# Patient Record
Sex: MALE | Race: WHITE | Employment: FULL TIME | URBAN - NONMETROPOLITAN AREA
[De-identification: names, ages, dates, MRNs, and addresses within clinical notes are randomized per-mention and may not be internally consistent; named-entity substitution may affect disease eponyms.]

---

## 2021-04-30 ENCOUNTER — HOSPITAL ENCOUNTER (EMERGENCY)
Age: 31
Discharge: HOME | End: 2021-04-30
Attending: EMERGENCY MEDICINE
Payer: COMMERCIAL

## 2021-04-30 VITALS
OXYGEN SATURATION: 100 % | SYSTOLIC BLOOD PRESSURE: 157 MMHG | RESPIRATION RATE: 18 BRPM | TEMPERATURE: 99 F | HEART RATE: 77 BPM | DIASTOLIC BLOOD PRESSURE: 90 MMHG

## 2021-04-30 DIAGNOSIS — K46.9 NON-RECURRENT ABDOMINAL HERNIA WITHOUT OBSTRUCTION OR GANGRENE, UNSPECIFIED HERNIA TYPE: Primary | ICD-10-CM

## 2021-04-30 PROCEDURE — 99283 EMERGENCY DEPT VISIT LOW MDM: CPT

## 2021-04-30 ASSESSMENT — PAIN DESCRIPTION - PAIN TYPE: TYPE: ACUTE PAIN

## 2021-04-30 ASSESSMENT — ENCOUNTER SYMPTOMS
NAUSEA: 0
CHEST TIGHTNESS: 0
ABDOMINAL PAIN: 1
RHINORRHEA: 0
COLOR CHANGE: 0
SHORTNESS OF BREATH: 0
DIARRHEA: 0
VOMITING: 0

## 2021-04-30 ASSESSMENT — PAIN DESCRIPTION - LOCATION: LOCATION: ABDOMEN

## 2021-04-30 ASSESSMENT — PAIN DESCRIPTION - FREQUENCY: FREQUENCY: CONTINUOUS

## 2021-04-30 ASSESSMENT — PAIN DESCRIPTION - ORIENTATION: ORIENTATION: RIGHT;LOWER

## 2021-05-01 NOTE — ED PROVIDER NOTES
[facility] ED  Emergency Department Encounter  EmergencyMedicine Attending     Pt Laurence Flores  MRN: [id number]  [place] 1990  Date of evaluation: 4/30/21  PCP:  SYDNEE Pimentel CNP    CHIEF COMPLAINT       Chief Complaint   Patient presents with    Abdominal Pain     bulging presenting with shooting pains       HISTORY OF PRESENT ILLNESS  (Location/Symptom, Timing/Onset, Context/Setting, Quality, Duration, Modifying Factors, Severity.)      Yandy Jaime is a 32 y.o. male who presents with concern for a abdominal hernia. Patient says that earlier he felt a bulge in his right lower quadrant, and he had pain over that bulge area. His significant other is in the room who also agrees that there was a bulge present in the right lower quadrant of the abdomen. This has been ongoing for the last 2 days, intermittently, passing gas and having bowel movements. However on arrival the bulging is completely resolved. The patient no longer has any abdominal pain. He is back to being completely asymptomatic, there is no abdominal pain, no nausea vomiting, completely normal appetite, no testicle pain or swelling, no dysuria frequency urgency, patient is completely asymptomatic and has no symptoms anymore. PAST MEDICAL / SURGICAL / SOCIAL / FAMILY HISTORY      has a past medical history of GERD (gastroesophageal reflux disease) and Other irritable bowel syndrome. has a past surgical history that includes Upper gastrointestinal endoscopy (08/08/2016) and Colonoscopy (09/26/2016).     Social History     Socioeconomic History    Marital status: Single     Spouse name: Not on file    Number of children: Not on file    Years of education: Not on file    Highest education level: Not on file   Occupational History    Not on file   Social Needs    Financial resource strain: Not on file    Food insecurity     Worry: Not on file     Inability: Not on file   IceBreaker needs Medical: Not on file     Non-medical: Not on file   Tobacco Use    Smoking status: Current Every Day Smoker     Packs/day: 1.00     Types: Cigarettes     Start date: 1/1/2004    Smokeless tobacco: Never Used    Tobacco comment: pt quit in 2016 and has since started back up   Substance and Sexual Activity    Alcohol use: No    Drug use: Never    Sexual activity: Not on file   Lifestyle    Physical activity     Days per week: Not on file     Minutes per session: Not on file    Stress: Not on file   Relationships    Social connections     Talks on phone: Not on file     Gets together: Not on file     Attends Hoahaoism service: Not on file     Active member of club or organization: Not on file     Attends meetings of clubs or organizations: Not on file     Relationship status: Not on file    Intimate partner violence     Fear of current or ex partner: Not on file     Emotionally abused: Not on file     Physically abused: Not on file     Forced sexual activity: Not on file   Other Topics Concern    Not on file   Social History Narrative    Not on file       History reviewed. No pertinent family history. Allergies:  Patient has no known allergies. Home Medications:  Prior to Admission medications    Medication Sig Start Date End Date Taking? Authorizing Provider   Eluxadoline (VIBERZI) 100 MG TABS Take 100 mg by mouth 2 times daily for 60 days. 3/31/21 5/30/21 Yes Katelyn Daly MD   Omeprazole 20 MG TBDD Take 1 tablet by mouth daily    Yes Historical Provider, MD   Probiotic Product (PROBIOTIC PO) Take by mouth    Historical Provider, MD       REVIEW OF SYSTEMS    (2-9 systems for level 4, 10 or more for level 5)      Review of Systems   Constitutional: Negative for chills and fever. HENT: Negative for congestion and rhinorrhea. Respiratory: Negative for chest tightness and shortness of breath. Cardiovascular: Negative for chest pain and leg swelling.    Gastrointestinal: Positive for abdominal pain. Negative for diarrhea, nausea and vomiting. Abdominal pain that is now resolved   Genitourinary: Negative for dysuria, scrotal swelling and testicular pain. Skin: Negative for color change. Neurological: Negative for weakness and numbness. Psychiatric/Behavioral: Negative for confusion. PHYSICAL EXAM   (up to 7 for level 4, 8 or more for level 5)      INITIAL VITALS:   BP (!) 157/90   Pulse 77   Temp 99 °F (37.2 °C) (Tympanic)   Resp 18   SpO2 100%     Physical Exam  Constitutional:       General: He is not in acute distress. Appearance: He is well-developed. HENT:      Head: Normocephalic and atraumatic. Eyes:      General:         Right eye: No discharge. Left eye: No discharge. Conjunctiva/sclera: Conjunctivae normal.   Cardiovascular:      Rate and Rhythm: Normal rate and regular rhythm. Heart sounds: Normal heart sounds. No murmur. No friction rub. No gallop. Pulmonary:      Effort: Pulmonary effort is normal. No respiratory distress. Breath sounds: Normal breath sounds. No wheezing or rales. Abdominal:      General: There is no distension. Palpations: Abdomen is soft. Tenderness: There is no abdominal tenderness. There is no guarding or rebound. Comments: Absolutely no abdominal pain or tenderness on examination, no tenderness over the right lower quadrant at the McBurney point. No abdominal mass or hernias present on my exam.  Patient is completely back to his baseline, abdomen is soft, nontender, nondistended   Musculoskeletal:         General: No tenderness. Skin:     General: Skin is warm and dry. Findings: No erythema. DIFFERENTIAL  DIAGNOSIS     PLAN (LABS / IMAGING / EKG):  No orders of the defined types were placed in this encounter. MEDICATIONS ORDERED:  No orders of the defined types were placed in this encounter.       DDX: Hernia versus torsion versus appendicitis    DIAGNOSTIC RESULTS /

## 2021-11-11 ENCOUNTER — HOSPITAL ENCOUNTER (OUTPATIENT)
Age: 31
Discharge: HOME | End: 2021-11-11
Payer: COMMERCIAL

## 2021-11-11 DIAGNOSIS — K58.0 IRRITABLE BOWEL SYNDROME WITH DIARRHEA: ICD-10-CM

## 2021-11-11 LAB
AMPHETAMINE SCREEN URINE: NEGATIVE
BARBITURATE SCREEN URINE: NEGATIVE
BENZODIAZEPINE SCREEN, URINE: NEGATIVE
BUPRENORPHINE URINE: NEGATIVE
CANNABINOID SCREEN URINE: NEGATIVE
COCAINE METABOLITE, URINE: NEGATIVE
MDMA URINE: NORMAL
METHADONE SCREEN, URINE: NEGATIVE
METHAMPHETAMINE, URINE: NEGATIVE
OPIATES, URINE: NEGATIVE
OXYCODONE SCREEN URINE: NEGATIVE
PHENCYCLIDINE, URINE: NEGATIVE
PROPOXYPHENE, URINE: NEGATIVE
TEST INFORMATION: NORMAL
TRICYCLIC ANTIDEPRESSANTS, UR: NEGATIVE

## 2021-11-11 PROCEDURE — 80306 DRUG TEST PRSMV INSTRMNT: CPT

## 2023-02-03 ENCOUNTER — HOSPITAL ENCOUNTER (OUTPATIENT)
Age: 33
Discharge: HOME | End: 2023-02-03
Payer: COMMERCIAL

## 2023-02-03 DIAGNOSIS — K58.0 IRRITABLE BOWEL SYNDROME WITH DIARRHEA: ICD-10-CM

## 2023-02-03 LAB
AMPHETAMINE SCREEN URINE: NEGATIVE
BARBITURATE SCREEN URINE: NEGATIVE
BENZODIAZEPINE SCREEN, URINE: NEGATIVE
BUPRENORPHINE URINE: NEGATIVE
CANNABINOID SCREEN URINE: NEGATIVE
COCAINE METABOLITE, URINE: NEGATIVE
FENTANYL URINE: NEGATIVE
METHADONE SCREEN, URINE: NEGATIVE
OPIATES, URINE: POSITIVE
OXYCODONE SCREEN URINE: NEGATIVE
PHENCYCLIDINE, URINE: NEGATIVE

## 2023-02-03 PROCEDURE — 80307 DRUG TEST PRSMV CHEM ANLYZR: CPT

## 2023-05-17 ENCOUNTER — APPOINTMENT (OUTPATIENT)
Dept: GENERAL RADIOLOGY | Age: 33
End: 2023-05-17
Payer: COMMERCIAL

## 2023-05-17 ENCOUNTER — HOSPITAL ENCOUNTER (EMERGENCY)
Age: 33
Discharge: HOME | End: 2023-05-17
Attending: EMERGENCY MEDICINE
Payer: COMMERCIAL

## 2023-05-17 VITALS
OXYGEN SATURATION: 98 % | HEART RATE: 68 BPM | TEMPERATURE: 97.8 F | RESPIRATION RATE: 16 BRPM | DIASTOLIC BLOOD PRESSURE: 74 MMHG | SYSTOLIC BLOOD PRESSURE: 139 MMHG

## 2023-05-17 DIAGNOSIS — S61.209A AVULSION OF FINGER TIP, INITIAL ENCOUNTER: Primary | ICD-10-CM

## 2023-05-17 PROCEDURE — 6370000000 HC RX 637 (ALT 250 FOR IP): Performed by: EMERGENCY MEDICINE

## 2023-05-17 PROCEDURE — 99284 EMERGENCY DEPT VISIT MOD MDM: CPT

## 2023-05-17 PROCEDURE — 73140 X-RAY EXAM OF FINGER(S): CPT

## 2023-05-17 PROCEDURE — 82075 ASSAY OF BREATH ETHANOL: CPT

## 2023-05-17 RX ORDER — CEPHALEXIN 500 MG/1
500 CAPSULE ORAL 2 TIMES DAILY
Qty: 10 CAPSULE | Refills: 0 | Status: SHIPPED | OUTPATIENT
Start: 2023-05-17 | End: 2023-05-22

## 2023-05-17 RX ORDER — BACITRACIN ZINC 500 [USP'U]/G
OINTMENT TOPICAL 2 TIMES DAILY
Status: DISCONTINUED | OUTPATIENT
Start: 2023-05-17 | End: 2023-05-17 | Stop reason: HOSPADM

## 2023-05-17 RX ADMIN — BACITRACIN ZINC: 500 OINTMENT TOPICAL at 10:32

## 2023-05-17 ASSESSMENT — PAIN - FUNCTIONAL ASSESSMENT: PAIN_FUNCTIONAL_ASSESSMENT: 0-10

## 2023-05-17 ASSESSMENT — LIFESTYLE VARIABLES: HOW OFTEN DO YOU HAVE A DRINK CONTAINING ALCOHOL: NEVER

## 2023-05-17 ASSESSMENT — PAIN DESCRIPTION - DESCRIPTORS: DESCRIPTORS: DISCOMFORT

## 2023-05-17 ASSESSMENT — PAIN SCALES - GENERAL: PAINLEVEL_OUTOF10: 2

## 2023-05-17 ASSESSMENT — PAIN DESCRIPTION - ORIENTATION: ORIENTATION: LEFT

## 2023-05-17 ASSESSMENT — PAIN DESCRIPTION - LOCATION: LOCATION: FINGER (COMMENT WHICH ONE)

## 2023-05-17 NOTE — DISCHARGE INSTRUCTIONS
Keep the wound clean and dry. Change the bandage twice daily or anytime it becomes wet or soiled. Apply bacitracin ointment at each bandage change. Return to the ED for increasing pain, swelling of the finger, development of fever or any other concerns. Avoid exposure to any potential contaminating substances, such as dirty water.

## 2023-05-17 NOTE — ED NOTES
Wound care performed and dressing put on patients left finger per Dr. Shiela Issa.      Leif Easton RN  05/17/23 [id number]

## 2023-05-19 NOTE — ED PROVIDER NOTES
[address]      Pt Name: Bhavya Farrell  MRN: [id number]  Birthdate 1990  Date of evaluation: 5/17/2023  Provider: Monica Pulido MD    CHIEF COMPLAINT       Chief Complaint   Patient presents with    Hand Injury     Crushing injury to left ring finger         HISTORY OF PRESENT ILLNESS      Bhavya Farrell is a right-hand-dominant 35 y.o. male who presents to the emergency department for evaluation of a crushing injury to the left ring finger that occurred 2 days prior. 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 Initially did not think anything of the injury, but noted a skin avulsion with some bleeding associated with this when he removed his glove. Has kept the wound clean and bandaged since, but when he removed the bandage today he noticed that his skin was bunched up under the nail. He does have some mild discomfort. No swelling. No fever. No change in strength or sensation. No other complaints at this time. PAST MEDICAL HISTORY     Past Medical History:   Diagnosis Date    GERD (gastroesophageal reflux disease)     Other irritable bowel syndrome     ibs d         SURGICAL HISTORY       Past Surgical History:   Procedure Laterality Date    COLONOSCOPY  09/26/2016    -bx    UPPER GASTROINTESTINAL ENDOSCOPY  08/08/2016    Dr. Isamar Rushing- Biopsy = Negative for H Pylori. CURRENT MEDICATIONS       Discharge Medication List as of 5/17/2023 10:05 AM        CONTINUE these medications which have NOT CHANGED    Details   Eluxadoline (VIBERZI) 100 MG TABS Take 1 tablet by mouth in the morning and at bedtime for 90 days. Max Daily Amount: 2 tablets, Disp-60 tablet, R-2Normal      Omeprazole 20 MG TBDD Take 1 tablet by mouth daily Historical Med             ALLERGIES       Patient has no known allergies.     FAMILY HISTORY       Family History   Problem Relation Age of Onset    No Known Problems Mother     No

## 2023-08-16 ENCOUNTER — HOSPITAL ENCOUNTER (EMERGENCY)
Age: 33
Discharge: HOME | End: 2023-08-16
Attending: EMERGENCY MEDICINE
Payer: COMMERCIAL

## 2023-08-16 ENCOUNTER — APPOINTMENT (OUTPATIENT)
Dept: GENERAL RADIOLOGY | Age: 33
End: 2023-08-16
Payer: COMMERCIAL

## 2023-08-16 VITALS
RESPIRATION RATE: 16 BRPM | HEART RATE: 80 BPM | SYSTOLIC BLOOD PRESSURE: 122 MMHG | OXYGEN SATURATION: 99 % | TEMPERATURE: 98.1 F | DIASTOLIC BLOOD PRESSURE: 80 MMHG

## 2023-08-16 DIAGNOSIS — S63.125A CLOSED DISLOCATION OF INTERPHALANGEAL JOINT OF LEFT THUMB, INITIAL ENCOUNTER: Primary | ICD-10-CM

## 2023-08-16 PROCEDURE — 99283 EMERGENCY DEPT VISIT LOW MDM: CPT

## 2023-08-16 PROCEDURE — 73140 X-RAY EXAM OF FINGER(S): CPT

## 2023-08-16 PROCEDURE — 2500000003 HC RX 250 WO HCPCS: Performed by: EMERGENCY MEDICINE

## 2023-08-16 PROCEDURE — 26770 TREAT FINGER DISLOCATION: CPT

## 2023-08-16 PROCEDURE — 82075 ASSAY OF BREATH ETHANOL: CPT

## 2023-08-16 RX ORDER — LIDOCAINE HYDROCHLORIDE 10 MG/ML
2 INJECTION, SOLUTION EPIDURAL; INFILTRATION; INTRACAUDAL; PERINEURAL ONCE
Status: COMPLETED | OUTPATIENT
Start: 2023-08-16 | End: 2023-08-16

## 2023-08-16 RX ADMIN — LIDOCAINE HYDROCHLORIDE 2 ML: 10 INJECTION, SOLUTION EPIDURAL; INFILTRATION; INTRACAUDAL; PERINEURAL at 14:54

## 2023-08-16 ASSESSMENT — ENCOUNTER SYMPTOMS
ABDOMINAL DISTENTION: 0
SORE THROAT: 0
SHORTNESS OF BREATH: 0
BACK PAIN: 0

## 2023-08-16 ASSESSMENT — PAIN - FUNCTIONAL ASSESSMENT
PAIN_FUNCTIONAL_ASSESSMENT: NONE - DENIES PAIN
PAIN_FUNCTIONAL_ASSESSMENT: 0-10

## 2023-08-16 ASSESSMENT — PAIN DESCRIPTION - ORIENTATION: ORIENTATION: LEFT

## 2023-08-16 ASSESSMENT — PAIN SCALES - GENERAL: PAINLEVEL_OUTOF10: 4

## 2023-08-16 ASSESSMENT — PAIN DESCRIPTION - LOCATION: LOCATION: HAND

## 2023-08-16 NOTE — DISCHARGE INSTRUCTIONS
Keep the splint on at all times. Follow-up with occupational health and or orthopedics in the next 2 to 3 days. Tylenol and ibuprofen for pain as needed. No use of the left hand until follow-up with occupational health.

## 2023-08-16 NOTE — ED PROVIDER NOTES
[address] [facility] ED  EMERGENCY DEPARTMENT ENCOUNTER      Pt Name: Ganga Costello  MRN: [id number]  [address] [facility] 1990  Date of evaluation: 8/16/2023  Provider: Dustin Rivera       Chief Complaint   Patient presents with    Hand Injury     Patient got chain caught around left hand/ thumb and injury occurred. HISTORY OF PRESENT ILLNESS   (Location/Symptom, Timing/Onset, Context/Setting, Quality, Duration, Modifying Factors, Severity)  Note limiting factors. Ganga Costello is a 35 y.o. male who presents to the emergency department with left thumb injury. Patient was at work when his left thumb got caught between a chain and a pole. Currently does appears deformed with possible proximal phalanx dislocation. He denies any pain until somebody moves his thumb. There is no open wounds. Patient denies any other injuries. REVIEW OF SYSTEMS    (2-9 systems for level 4, 10 or more for level 5)     Review of Systems   Constitutional:  Negative for fatigue and fever. HENT:  Negative for congestion and sore throat. Eyes:  Negative for visual disturbance. Respiratory:  Negative for shortness of breath. Cardiovascular:  Negative for chest pain and palpitations. Gastrointestinal:  Negative for abdominal distention. Genitourinary:  Negative for dysuria. Musculoskeletal:  Negative for back pain. Left thumb deformity   Skin:  Negative for rash. Psychiatric/Behavioral:  Negative for suicidal ideas. Except as noted above the remainder of the review of systems was reviewed and negative. PAST MEDICAL HISTORY     Past Medical History:   Diagnosis Date    GERD (gastroesophageal reflux disease)     Other irritable bowel syndrome     ibs d         SURGICAL HISTORY       Past Surgical History:   Procedure Laterality Date    COLONOSCOPY  09/26/2016    -bx    UPPER GASTROINTESTINAL ENDOSCOPY  08/08/2016    Dr. Erich Hogan- Biopsy = Negative for H Pylori.

## 2023-08-23 ENCOUNTER — HOSPITAL ENCOUNTER (OUTPATIENT)
Dept: MRI IMAGING | Age: 33
Discharge: HOME | End: 2023-08-25
Payer: COMMERCIAL

## 2023-08-23 DIAGNOSIS — T14.8XXA SPRAIN: ICD-10-CM

## 2023-08-23 PROCEDURE — 73218 MRI UPPER EXTREMITY W/O DYE: CPT

## 2023-08-25 ENCOUNTER — HOSPITAL ENCOUNTER
Age: 33
Discharge: HOME | End: 2023-08-25
Payer: COMMERCIAL

## 2023-08-25 VITALS
OXYGEN SATURATION: 95 % | RESPIRATION RATE: 16 BRPM | SYSTOLIC BLOOD PRESSURE: 123 MMHG | HEART RATE: 67 BPM | TEMPERATURE: 98 F | DIASTOLIC BLOOD PRESSURE: 77 MMHG

## 2023-08-25 DIAGNOSIS — Z01.810: Primary | ICD-10-CM

## 2023-08-25 DIAGNOSIS — Z01.812: ICD-10-CM

## 2023-08-25 DIAGNOSIS — S63.682A: ICD-10-CM

## 2023-08-25 DIAGNOSIS — F17.290: ICD-10-CM

## 2023-08-25 LAB
ADD MANUAL DIFF: NO
ANION GAP: 13.6
BLOOD UREA NITROGEN: 14 MG/DL (ref 7–18)
CALCIUM: 9.3 MG/DL (ref 8.5–10.1)
CARBON DIOXIDE: 28.9 MMOL/L (ref 21–32)
CHLORIDE: 104 MMOL/L (ref 98–107)
CO2 BLD-SCNC: 28.9 MMOL/L (ref 21–32)
ESTIMATED GFR (AFRICAN AMERICA: >60 (ref 60–?)
ESTIMATED GFR (NON-AFRICAN AME: >60 (ref 60–?)
GLUCOSE BLD-MCNC: 91 MG/DL (ref 74–106)
HCT VFR BLD CALC: 43.7 % (ref 42–54)
HEMATOCRIT: 43.7 % (ref 42–54)
HEMOGLOBIN: 14.9 G/DL (ref 14–18)
IMMATURE GRANULOCYTES ABS AUTO: 0.02 10^3/UL (ref 0–0.03)
IMMATURE GRANULOCYTES PCT AUTO: 0.3 % (ref 0–0.5)
LYMPHOCYTES  ABSOLUTE AUTO: 1.8 10^3/UL (ref 1.2–3.8)
MCV RBC: 89.2 FL (ref 80–94)
MEAN CORPUSCULAR HEMOGLOBIN: 30.4 PG (ref 25.9–34)
MEAN CORPUSCULAR HGB CONC: 34.1 G/DL (ref 29.9–35.2)
MEAN CORPUSCULAR VOLUME: 89.2 FL (ref 80–94)
PLATELET # BLD: 153 10^3/UL (ref 150–450)
PLATELET COUNT: 153 10^3/UL (ref 150–450)
POTASSIUM SERPLBLD-SCNC: 3.5 MMOL/L (ref 3.5–5.1)
POTASSIUM: 3.5 MMOL/L (ref 3.5–5.1)
RED BLOOD COUNT: 4.9 10^6/UL (ref 4.7–6.1)
SODIUM BLD-SCNC: 143 MMOL/L (ref 136–145)
SODIUM: 143 MMOL/L (ref 136–145)
WBC # BLD: 6 10^3/UL (ref 4–11)
WHITE BLOOD COUNT: 6 10^3/UL (ref 4–11)

## 2023-08-25 PROCEDURE — 71046 X-RAY EXAM CHEST 2 VIEWS: CPT

## 2023-08-25 PROCEDURE — G0463 HOSPITAL OUTPT CLINIC VISIT: HCPCS

## 2023-08-25 PROCEDURE — 80048 BASIC METABOLIC PNL TOTAL CA: CPT

## 2023-08-25 PROCEDURE — 85025 COMPLETE CBC W/AUTO DIFF WBC: CPT

## 2023-08-25 PROCEDURE — 36415 COLL VENOUS BLD VENIPUNCTURE: CPT

## 2023-08-25 NOTE — XR_ITS
The [address] [address] 
     [phone] 
  
  
Patient Name: 
MAUREEN AVILES 
  
MRN: [id number]    YOB: 1990    Sex: M 
Assigned Patient Location: [facility] 
[facility] 
Accession/Order Number: [id number] 
Exam Date: 8/25/2023  13:00    Report Date: 8/25/2023  13:20 
  
At the request of: 
ROSIO LIZARRAGA   
  
Procedure:  XR chest 2V 
  
EXAM: XR chest 2V  
  
HISTORY: PRE OP EXAM  
  
COMPARISON: None.  
  
TECHNIQUE: PA and lateral views of the chest. 
  
FINDINGS:  
The cardiomediastinal silhouette is normal. 
  
No focal consolidation is identified. 
There is no pneumothorax. 
No pleural effusion is noted. 
  
The osseous structures are intact. 
  
ORDER #: [id number] XR/XR chest 2V  
IMPRESSION:   
No acute cardiopulmonary process.  
   
   
Electronically authenticated by: UMM HARRIS   Date: 8/25/2023  13:20

## 2023-08-25 NOTE — P.GSHP_ITS
History of Present Illness    
History of Present Illness    
Chief complaint: left thumb ulnar collateral ligament tear    
Narrative:     
Patient presents for preadmission testing. Please see HPI from Dr. Hernandez   
dated 08/25/2023.    
    
Review of Systems    
    
    
ROS      
    
 Narrative     
REVIEW OF SYSTEMS:  Negative except as stated in HPI, ten or more systems   
reviewed.    
              
   Constitutional:  No fever , chills, weakness    
         
     ENT:  No sore throat or epistaxis    
    
     Cardiovascular: No edema, chest pain, palpitations, or activity intolerance    
    
     Respiratory:  No shortness of breath, cough, or wheezing    
    
     Gastrointestinal:  No abdominal pain, constipation, diarrhea, or vomiting    
        
     Genitourinary:  No dysuria or hematuria    
          
     Neurological:  No numbness, tingling, weakness, or headache    
     
     Psychiatric:  No mood changes    
       
    
    
PFSH    
PFS    
Medical History (Updated 08/25/23 @ 12:50 by Fabiola Youngblood NP)    
    
    
    
Surgical History (Updated 08/25/23 @ 12:50 by Fabiola Youngblood NP)    
    
    
    
Social History (Updated 08/25/23 @ 12:48 by Fabiola Youngblood NP)    
Within the past year, how often did you have a drink containing alcohol:  never     
Score interpretation:  A score less than 4 is consistent with normal alcohol   
consumption.     
Do you use any of these nicotine containing products:  vaping products     
Non-prescribed substance use:  denies use     
Previous occupational history:       
Highest level of school completed/degree received:  high school graduate     
    
    
    
Meds    
Home Medications and Allergies    
                                Home Medications    
    
    
    
 Medication  Instructions  Recorded  Confirmed  Type    
     
eluxadoline 100 mg tablet (Viberzi) 100 mg PO BID 08/25/23 08/25/23 History    
     
hydrocodone 5 mg-acetaminophen 325 1 tab PO Q6H PRN pain 08/25/23 08/25/23   
History    
    
mg tablet        
     
omeprazole 40 mg capsule,delayed 40 mg PO DAILY 08/25/23 08/25/23 History    
    
release        
    
    
    
                                    Allergies    
    
    
    
Allergy/AdvReac Type Severity Reaction Status Date / Time    
     
No Known Drug Allergies Allergy   Verified 08/25/23 12:46    
    
    
    
    
Exam    
Narrative    
Exam Narrative:     
Constitutional: Awake, alert, comfortable, well-appearing, nontoxic,   
interactive, vital signs as charted    
Head: Normocephalic, atraumatic    
Neck: Supple, normal appearance, normal range of motion, no meningeal signs, no   
lymphadenopathy    
Respiratory: No respiratory distress, breath sounds clear    
Cardiovascular: Regular rate and rhythm, strong and regular heart tones    
Psychiatric: Oriented ?3, normal affect    
    
    
Assessment and Plan    
Assessment and Plan    
(1) Ligament tear:     
    
Plan    
Left thumb ulnar collateral ligament repair versus reconstruction with palmaris   
longus, possible pinning scheduled with Dr. Hernandez 08/28/2023.

## 2023-08-28 ENCOUNTER — HOSPITAL ENCOUNTER (OUTPATIENT)
Age: 33
Discharge: HOME | End: 2023-08-28
Payer: COMMERCIAL

## 2023-08-28 VITALS
DIASTOLIC BLOOD PRESSURE: 88 MMHG | HEART RATE: 68 BPM | OXYGEN SATURATION: 97 % | SYSTOLIC BLOOD PRESSURE: 163 MMHG | RESPIRATION RATE: 17 BRPM

## 2023-08-28 VITALS
SYSTOLIC BLOOD PRESSURE: 169 MMHG | OXYGEN SATURATION: 100 % | RESPIRATION RATE: 19 BRPM | HEART RATE: 77 BPM | DIASTOLIC BLOOD PRESSURE: 98 MMHG

## 2023-08-28 VITALS
SYSTOLIC BLOOD PRESSURE: 165 MMHG | OXYGEN SATURATION: 98 % | DIASTOLIC BLOOD PRESSURE: 93 MMHG | HEART RATE: 64 BPM | RESPIRATION RATE: 10 BRPM

## 2023-08-28 VITALS
RESPIRATION RATE: 18 BRPM | OXYGEN SATURATION: 99 % | SYSTOLIC BLOOD PRESSURE: 123 MMHG | DIASTOLIC BLOOD PRESSURE: 78 MMHG | HEART RATE: 69 BPM

## 2023-08-28 VITALS
OXYGEN SATURATION: 98 % | SYSTOLIC BLOOD PRESSURE: 132 MMHG | DIASTOLIC BLOOD PRESSURE: 76 MMHG | HEART RATE: 88 BPM | TEMPERATURE: 97.2 F | RESPIRATION RATE: 16 BRPM

## 2023-08-28 VITALS
HEART RATE: 73 BPM | SYSTOLIC BLOOD PRESSURE: 160 MMHG | RESPIRATION RATE: 18 BRPM | OXYGEN SATURATION: 98 % | DIASTOLIC BLOOD PRESSURE: 92 MMHG

## 2023-08-28 VITALS
OXYGEN SATURATION: 100 % | RESPIRATION RATE: 26 BRPM | SYSTOLIC BLOOD PRESSURE: 158 MMHG | HEART RATE: 81 BPM | DIASTOLIC BLOOD PRESSURE: 106 MMHG

## 2023-08-28 VITALS
OXYGEN SATURATION: 100 % | RESPIRATION RATE: 17 BRPM | SYSTOLIC BLOOD PRESSURE: 158 MMHG | DIASTOLIC BLOOD PRESSURE: 106 MMHG | HEART RATE: 78 BPM | TEMPERATURE: 98.42 F

## 2023-08-28 VITALS
HEART RATE: 66 BPM | SYSTOLIC BLOOD PRESSURE: 162 MMHG | RESPIRATION RATE: 14 BRPM | DIASTOLIC BLOOD PRESSURE: 81 MMHG | OXYGEN SATURATION: 99 %

## 2023-08-28 VITALS — BODY MASS INDEX: 23.8 KG/M2

## 2023-08-28 DIAGNOSIS — F17.290: ICD-10-CM

## 2023-08-28 DIAGNOSIS — S63.641A: Primary | ICD-10-CM

## 2023-08-28 PROCEDURE — 36415 COLL VENOUS BLD VENIPUNCTURE: CPT

## 2023-08-28 PROCEDURE — C1713 ANCHOR/SCREW BN/BN,TIS/BN: HCPCS

## 2023-08-28 PROCEDURE — 76000 FLUOROSCOPY <1 HR PHYS/QHP: CPT

## 2023-08-28 PROCEDURE — 26540 REPAIR HAND JOINT: CPT

## 2023-08-28 PROCEDURE — 73140 X-RAY EXAM OF FINGER(S): CPT

## 2023-08-28 NOTE — PM.ORPRC
Procedure Note
Date of procedure: 08/28/23
Pre-op diagnosis: Left thumb ulnar collateral ligament tear at metacarpal phalangeal joint
Post-op diagnosis: same as pre-op
Procedure: 
Procedure:
Left thumb ulnar collateral ligament repair

Operative procedure:
After informed consent was obtained the patient brought to the operating room where a general anesthetic was administered.  Median nerve block and ring block for the superficial branch of radial nerve was performed with an equal mixture of 5 mL 1% 
lidocaine with epinephrine and 5 mL 0.5% Marcaine plain.  A well-padded proximal arm tourniquet was placed.  The left arm was prepped and draped in the usual sterile fashion.
A 3-1/2 cm incision was made in the mid axial line centered over the ulnar collateral ligament at the metacarpophalangeal joint.  Blunt dissection was carried down through soft tissue in order to preserve digital neurovascular structures.  The 
abductor aponeurosis had been torn.  The joint was subluxed.  The ulnar collateral ligament was identified.  This was in good condition and was easily mobilized to its insertion onto the proximal phalanx.  Metacarpal head and articular cartilage at 
the base of the proximal phalanx were inspected and no articular cartilage damage was evident.  The joint was irrigated.  There was performed using the Arthrex internal brace system.
A 2 guidepins were first placed 1 at the origin of the ulnar collateral ligament at the metacarpal and 1 at the ulnar collateral ligament insertion on the proximal phalanx.  X-rays confirmed appropriate placement.  These were then overdrilled with 
using the stop at 1 cm.  Next a 2-0 FiberWire suture was placed in a running locking fashion within the ulnar collateral ligament.  This was then attached to DX swivel lock SL fork tip anchor along with the preloaded labral tape.  The ligament was 
repaired to the proximal phalanx resulting in a nice reduction of the joint and stable repair.  The 2 labral tape ends were then crossed over the top of the repair and inserted to the previously drilled hole in the metacarpal secured with a second 
DX swivel lock SL fork tip anchor.  Solid repair was achieved.  With the repair there was complete stability to the ulnar collateral ligament with reduced joint.  X-rays revealed a reduced joint in multiple planes.  The abductor aponeurosis was 
repaired with 4-0 Vicryl suture in an interrupted figure-of-eight fashion.  Tourniquet was deflated wound was irrigated and closed in standard fashion with absorbable suture.  Steri-Strips and sterile dressing were placed.  A plaster thumb spica 
splint was placed.  Patient was awakened and brought to the recovery room in stable condition.  There were no intraoperative or immediate postoperative complications.
Anesthesia: General-LMA
Estimated blood loss (mL): 10
Pathology: none sent
Condition: stable
Disposition: same day

## 2023-08-28 NOTE — XR_ITS
The [address] [address] 
     [phone] 
  
  
Patient Name: 
MAUREEN AVILES 
  
MRN: [id number]    YOB: 1990    Sex: M 
Assigned Patient Location: SURGOUT 
Current Patient Location:   
Accession/Order Number: [id number] 
Exam Date: 8/28/2023  13:30    Report Date: 8/28/2023  15:49 
  
At the request of: 
ROSIO LIZARRAGA   
  
Procedure:  XR finger LT min 2V 
  
EXAM: XR finger LT min 2V  
  
HISTORY: LT THUMB ULNAR COLLATERAL LIGAMENT REPAIR 
  
COMPARISON: None.  
  
ORDER #: [id number] XR/XR finger LT min 2V  
Impression:  
1. Operative fluoroscopy was provided and 3 spot images of the left first   
phalanx were obtained.  
   
   
Electronically authenticated by: EMANUEL MCFADDEN   Date: 8/28/2023  15:49

## 2023-08-28 NOTE — P.ORPRC_ITS
Procedure Note    
Date of procedure: 08/28/23    
Pre-op diagnosis: Left thumb ulnar collateral ligament tear at metacarpal   
phalangeal joint    
Post-op diagnosis: same as pre-op    
Procedure:     
Procedure:    
Left thumb ulnar collateral ligament repair    
    
Operative procedure:    
After informed consent was obtained the patient brought to the operating room   
where a general anesthetic was administered.  Median nerve block and ring block   
for the superficial branch of radial nerve was performed with an equal mixture   
of 5 mL 1% lidocaine with epinephrine and 5 mL 0.5% Marcaine plain.  A well-  
padded proximal arm tourniquet was placed.  The left arm was prepped and draped   
in the usual sterile fashion.    
A 3-1/2 cm incision was made in the mid axial line centered over the ulnar   
collateral ligament at the metacarpophalangeal joint.  Blunt dissection was   
carried down through soft tissue in order to preserve digital neurovascular   
structures.  The abductor aponeurosis had been torn.  The joint was subluxed.    
The ulnar collateral ligament was identified.  This was in good condition and   
was easily mobilized to its insertion onto the proximal phalanx.  Metacarpal   
head and articular cartilage at the base of the proximal phalanx were inspected   
and no articular cartilage damage was evident.  The joint was irrigated.  There   
was performed using the Arthrex internal brace system.    
A 2 guidepins were first placed 1 at the origin of the ulnar collateral ligament  
at the metacarpal and 1 at the ulnar collateral ligament insertion on the   
proximal phalanx.  X-rays confirmed appropriate placement.  These were then   
overdrilled with using the stop at 1 cm.  Next a 2-0 FiberWire suture was placed  
in a running locking fashion within the ulnar collateral ligament.  This was   
then attached to DX swivel lock SL fork tip anchor along with the preloaded   
labral tape.  The ligament was repaired to the proximal phalanx resulting in a   
nice reduction of the joint and stable repair.  The 2 labral tape ends were then  
crossed over the top of the repair and inserted to the previously drilled hole   
in the metacarpal secured with a second DX swivel lock SL fork tip anchor.    
Solid repair was achieved.  With the repair there was complete stability to the   
ulnar collateral ligament with reduced joint.  X-rays revealed a reduced joint   
in multiple planes.  The abductor aponeurosis was repaired with 4-0 Vicryl   
suture in an interrupted figure-of-eight fashion.  Tourniquet was deflated wound  
was irrigated and closed in standard fashion with absorbable suture.  Steri-  
Strips and sterile dressing were placed.  A plaster thumb spica splint was   
placed.  Patient was awakened and brought to the recovery room in stable   
condition.  There were no intraoperative or immediate postoperative   
complications.    
Anesthesia: General-LMA    
Estimated blood loss (mL): 10    
Pathology: none sent    
Condition: stable    
Disposition: same day

## 2023-08-30 ENCOUNTER — HOSPITAL ENCOUNTER (EMERGENCY)
Age: 33
Discharge: HOME | End: 2023-08-30
Attending: EMERGENCY MEDICINE
Payer: COMMERCIAL

## 2023-08-30 VITALS
HEART RATE: 96 BPM | OXYGEN SATURATION: 100 % | RESPIRATION RATE: 18 BRPM | DIASTOLIC BLOOD PRESSURE: 98 MMHG | TEMPERATURE: 97.8 F | SYSTOLIC BLOOD PRESSURE: 167 MMHG

## 2023-08-30 DIAGNOSIS — R00.2 PALPITATIONS: Primary | ICD-10-CM

## 2023-08-30 PROCEDURE — 93005 ELECTROCARDIOGRAM TRACING: CPT | Performed by: EMERGENCY MEDICINE

## 2023-08-30 PROCEDURE — 99283 EMERGENCY DEPT VISIT LOW MDM: CPT

## 2023-08-30 RX ORDER — OXYCODONE HYDROCHLORIDE AND ACETAMINOPHEN 325; 5 MG/5ML; MG/5ML
5 SOLUTION ORAL EVERY 6 HOURS
COMMUNITY
End: 2023-09-01

## 2023-08-30 ASSESSMENT — PAIN - FUNCTIONAL ASSESSMENT: PAIN_FUNCTIONAL_ASSESSMENT: NONE - DENIES PAIN

## 2023-08-30 ASSESSMENT — ENCOUNTER SYMPTOMS
SHORTNESS OF BREATH: 0
TROUBLE SWALLOWING: 1
SORE THROAT: 0
BACK PAIN: 0
ABDOMINAL DISTENTION: 0

## 2023-08-30 NOTE — ED PROVIDER NOTES
Mouth/Throat:      Mouth: Mucous membranes are moist.      Pharynx: Uvula midline. Comments: The uvula is slightly enlarged and bruised likely secondary to recent intubation 2 days ago. Otherwise oropharynx is clear. Eyes:      Extraocular Movements: Extraocular movements intact. Pupils: Pupils are equal, round, and reactive to light. Cardiovascular:      Rate and Rhythm: Normal rate and regular rhythm. Pulses: Normal pulses. Heart sounds: Normal heart sounds. Pulmonary:      Effort: Pulmonary effort is normal.      Breath sounds: Normal breath sounds. Abdominal:      General: Abdomen is flat. Bowel sounds are normal.      Palpations: Abdomen is soft. Musculoskeletal:         General: Normal range of motion. Skin:     General: Skin is warm and dry. Capillary Refill: Capillary refill takes less than 2 seconds. Neurological:      General: No focal deficit present. Mental Status: He is alert and oriented to person, place, and time. Psychiatric:         Mood and Affect: Mood normal.       DIAGNOSTIC RESULTS     EKG: All EKG's are interpreted by the Emergency Department Physician who either signs or Co-signs this chart in the absence of a cardiologist.    Normal sinus rhythm with a heart rate of 86. No acute ST-T wave changes. RADIOLOGY:   Non-plain film images such as CT, Ultrasound and MRI are read by the radiologist. Plain radiographic images are visualized and preliminarily interpreted by the emergency physician with the below findings:        Interpretation per the Radiologist below, if available at the time of this note:    No orders to display         ED BEDSIDE ULTRASOUND:   Performed by ED Physician - none    LABS:  Labs Reviewed - No data to display    All other labs were within normal range or not returned as of this dictation.     EMERGENCY DEPARTMENT COURSE and DIFFERENTIAL DIAGNOSIS/MDM:   Vitals:    Vitals:    08/30/23 1943   BP: (!) 167/98   Pulse: 96

## 2023-08-31 LAB
EKG ATRIAL RATE: 86 BPM
EKG P AXIS: 81 DEGREES
EKG P-R INTERVAL: 140 MS
EKG Q-T INTERVAL: 370 MS
EKG QRS DURATION: 94 MS
EKG QTC CALCULATION (BAZETT): 442 MS
EKG R AXIS: 46 DEGREES
EKG T AXIS: -10 DEGREES
EKG VENTRICULAR RATE: 86 BPM

## 2023-08-31 PROCEDURE — 93010 ELECTROCARDIOGRAM REPORT: CPT | Performed by: FAMILY MEDICINE

## 2023-08-31 NOTE — DISCHARGE INSTRUCTIONS
Avoid using the oxycodone as much as possible. If you can just take Tylenol or ibuprofen. For breakthrough pains you can go back to using Percocets or Vicodin. Recommend cold liquids and ice chips to help heal the uvula as it is still bruised and slightly swollen. Avoid hot liquids. Return if your symptoms get worse.

## 2023-09-07 ENCOUNTER — HOSPITAL ENCOUNTER (OUTPATIENT)
Dept: GENERAL RADIOLOGY | Age: 33
Discharge: HOME | End: 2023-09-09
Payer: COMMERCIAL

## 2023-09-07 ENCOUNTER — HOSPITAL ENCOUNTER (OUTPATIENT)
Age: 33
Discharge: HOME | End: 2023-09-09

## 2023-09-07 ENCOUNTER — HOSPITAL ENCOUNTER (OUTPATIENT)
Age: 33
Discharge: HOME | End: 2023-09-07
Payer: COMMERCIAL

## 2023-09-07 DIAGNOSIS — R06.02 SHORTNESS OF BREATH: ICD-10-CM

## 2023-09-07 LAB — D DIMER PPP FEU-MCNC: <0.27 UG/ML FEU (ref 0–0.59)

## 2023-09-07 PROCEDURE — 36415 COLL VENOUS BLD VENIPUNCTURE: CPT

## 2023-09-07 PROCEDURE — 85379 FIBRIN DEGRADATION QUANT: CPT

## 2023-09-07 PROCEDURE — 71046 X-RAY EXAM CHEST 2 VIEWS: CPT

## 2023-09-12 ENCOUNTER — APPOINTMENT (OUTPATIENT)
Dept: GENERAL RADIOLOGY | Age: 33
End: 2023-09-12
Payer: COMMERCIAL

## 2023-09-12 ENCOUNTER — HOSPITAL ENCOUNTER (EMERGENCY)
Age: 33
Discharge: HOME | End: 2023-09-12
Attending: STUDENT IN AN ORGANIZED HEALTH CARE EDUCATION/TRAINING PROGRAM
Payer: COMMERCIAL

## 2023-09-12 VITALS
HEART RATE: 62 BPM | TEMPERATURE: 98.2 F | BODY MASS INDEX: 21.9 KG/M2 | DIASTOLIC BLOOD PRESSURE: 77 MMHG | SYSTOLIC BLOOD PRESSURE: 137 MMHG | WEIGHT: 157 LBS | OXYGEN SATURATION: 99 % | RESPIRATION RATE: 14 BRPM

## 2023-09-12 DIAGNOSIS — R07.89 OTHER CHEST PAIN: Primary | ICD-10-CM

## 2023-09-12 DIAGNOSIS — R06.02 SHORTNESS OF BREATH: ICD-10-CM

## 2023-09-12 LAB
ANION GAP SERPL CALCULATED.3IONS-SCNC: 14 MMOL/L (ref 9–17)
BASOPHILS # BLD: <0.03 K/UL (ref 0–0.2)
BASOPHILS NFR BLD: 0 % (ref 0–2)
BUN SERPL-MCNC: 7 MG/DL (ref 6–20)
BUN/CREAT SERPL: 8 (ref 9–20)
CALCIUM SERPL-MCNC: 9.8 MG/DL (ref 8.6–10.4)
CHLORIDE SERPL-SCNC: 101 MMOL/L (ref 98–107)
CO2 SERPL-SCNC: 24 MMOL/L (ref 20–31)
CREAT SERPL-MCNC: 0.9 MG/DL (ref 0.7–1.2)
EKG ATRIAL RATE: 87 BPM
EKG P AXIS: 70 DEGREES
EKG P-R INTERVAL: 132 MS
EKG Q-T INTERVAL: 380 MS
EKG QRS DURATION: 92 MS
EKG QTC CALCULATION (BAZETT): 457 MS
EKG R AXIS: 43 DEGREES
EKG T AXIS: 5 DEGREES
EKG VENTRICULAR RATE: 87 BPM
EOSINOPHIL # BLD: <0.03 K/UL (ref 0–0.44)
EOSINOPHILS RELATIVE PERCENT: 0 % (ref 1–4)
ERYTHROCYTE [DISTWIDTH] IN BLOOD BY AUTOMATED COUNT: 11.7 % (ref 11.8–14.4)
GFR SERPL CREATININE-BSD FRML MDRD: >60 ML/MIN/1.73M2
GLUCOSE SERPL-MCNC: 123 MG/DL (ref 70–99)
HCT VFR BLD AUTO: 41.8 % (ref 40.7–50.3)
HGB BLD-MCNC: 14.6 G/DL (ref 13–17)
IMM GRANULOCYTES # BLD AUTO: <0.03 K/UL (ref 0–0.3)
IMM GRANULOCYTES NFR BLD: 0 %
LYMPHOCYTES NFR BLD: 2.03 K/UL (ref 1.1–3.7)
LYMPHOCYTES RELATIVE PERCENT: 32 % (ref 24–43)
MCH RBC QN AUTO: 30.4 PG (ref 25.2–33.5)
MCHC RBC AUTO-ENTMCNC: 34.9 G/DL (ref 28.4–34.8)
MCV RBC AUTO: 87.1 FL (ref 82.6–102.9)
MONOCYTES NFR BLD: 0.45 K/UL (ref 0.1–1.2)
MONOCYTES NFR BLD: 7 % (ref 3–12)
NEUTROPHILS NFR BLD: 61 % (ref 36–65)
NEUTS SEG NFR BLD: 3.77 K/UL (ref 1.5–8.1)
NRBC BLD-RTO: 0 PER 100 WBC
PLATELET # BLD AUTO: 183 K/UL (ref 138–453)
PMV BLD AUTO: 11.9 FL (ref 8.1–13.5)
POTASSIUM SERPL-SCNC: 3.4 MMOL/L (ref 3.7–5.3)
RBC # BLD AUTO: 4.8 M/UL (ref 4.21–5.77)
SODIUM SERPL-SCNC: 139 MMOL/L (ref 135–144)
TROPONIN I SERPL HS-MCNC: <6 NG/L (ref 0–22)
WBC OTHER # BLD: 6.3 K/UL (ref 3.5–11.3)

## 2023-09-12 PROCEDURE — 80048 BASIC METABOLIC PNL TOTAL CA: CPT

## 2023-09-12 PROCEDURE — 71045 X-RAY EXAM CHEST 1 VIEW: CPT

## 2023-09-12 PROCEDURE — 93005 ELECTROCARDIOGRAM TRACING: CPT | Performed by: STUDENT IN AN ORGANIZED HEALTH CARE EDUCATION/TRAINING PROGRAM

## 2023-09-12 PROCEDURE — 84484 ASSAY OF TROPONIN QUANT: CPT

## 2023-09-12 PROCEDURE — 99285 EMERGENCY DEPT VISIT HI MDM: CPT

## 2023-09-12 PROCEDURE — 93010 ELECTROCARDIOGRAM REPORT: CPT | Performed by: INTERNAL MEDICINE

## 2023-09-12 PROCEDURE — 85025 COMPLETE CBC W/AUTO DIFF WBC: CPT

## 2023-09-12 PROCEDURE — 36415 COLL VENOUS BLD VENIPUNCTURE: CPT

## 2023-09-12 RX ORDER — FAMOTIDINE 20 MG/1
20 TABLET, FILM COATED ORAL 2 TIMES DAILY
Qty: 60 TABLET | Refills: 0 | Status: SHIPPED | OUTPATIENT
Start: 2023-09-12 | End: 2023-10-12

## 2023-09-12 ASSESSMENT — ENCOUNTER SYMPTOMS
SHORTNESS OF BREATH: 1
DIARRHEA: 0
VOMITING: 0
COUGH: 1
NAUSEA: 0
ABDOMINAL DISTENTION: 0
EYES NEGATIVE: 1

## 2023-09-12 ASSESSMENT — LIFESTYLE VARIABLES
HOW MANY STANDARD DRINKS CONTAINING ALCOHOL DO YOU HAVE ON A TYPICAL DAY: PATIENT DOES NOT DRINK
HOW OFTEN DO YOU HAVE A DRINK CONTAINING ALCOHOL: NEVER

## 2023-09-13 NOTE — ED PROVIDER NOTES
[facility] ED  Emergency Department Encounter  Emergency Medicine Attending     Pt Junito Benjamin  MRN: [id number]  [address] [place] 1990  Date of evaluation: 9/12/23  PCP:  SYDNEE Alvarado CNP  Note Started: 8:28 PM EDT      CHIEF COMPLAINT       Chief Complaint   Patient presents with    Chest Pain     Pt c/o CP and SOB ongoing. Pt states he was here recently for same thing and followed up with PCP. Pt states it happened after eating a hamburger. HISTORY OF PRESENT ILLNESS  (Location/Symptom, Timing/Onset, Context/Setting, Quality, Duration, Modifying Factors, Severity.)      Maday Pardo is a 35 y.o. male who presents with chest pain shortness of breath that has been happening intermittently especially after eating for several weeks. Patient has been on IBS medication for years and had recent surgery and was taken off and there has been some difficulty with his GI ever since. Patient states he has a burning-like sensation that stopped in the middle of his chest and feel like there is a rock stuck there afterwards and nothing can be swallowed to go down. Patient states that he does not know why this continues to happen but was hoping to have some relief and some explanation which is what brought him into the ER today. PAST MEDICAL / SURGICAL / SOCIAL / FAMILY HISTORY      has a past medical history of GERD (gastroesophageal reflux disease) and Other irritable bowel syndrome. has a past surgical history that includes Upper gastrointestinal endoscopy (08/08/2016) and Colonoscopy (09/26/2016).       Social History     Socioeconomic History    Marital status: Single     Spouse name: Not on file    Number of children: Not on file    Years of education: Not on file    Highest education level: Not on file   Occupational History    Not on file   Tobacco Use    Smoking status: Former     Packs/day: 1     Types: Cigarettes     Start date: 1/1/2004     Quit date: 10/2021

## 2023-09-14 ENCOUNTER — HOSPITAL ENCOUNTER (OUTPATIENT)
Dept: CT IMAGING | Age: 33
Discharge: HOME | End: 2023-09-16
Attending: ORTHOPAEDIC SURGERY
Payer: COMMERCIAL

## 2023-09-14 DIAGNOSIS — R06.02 SHORTNESS OF BREATH: ICD-10-CM

## 2023-09-14 PROCEDURE — 71250 CT THORAX DX C-: CPT

## 2023-09-18 ENCOUNTER — HOSPITAL ENCOUNTER (OUTPATIENT)
Age: 33
Discharge: HOME | End: 2023-09-18
Payer: COMMERCIAL

## 2023-09-18 DIAGNOSIS — K21.9 GASTROESOPHAGEAL REFLUX DISEASE, UNSPECIFIED WHETHER ESOPHAGITIS PRESENT: ICD-10-CM

## 2023-09-18 DIAGNOSIS — K29.00 ACUTE GASTRITIS, PRESENCE OF BLEEDING UNSPECIFIED, UNSPECIFIED GASTRITIS TYPE: ICD-10-CM

## 2023-09-18 PROCEDURE — 87338 HPYLORI STOOL AG IA: CPT

## 2023-09-19 LAB
MICROORGANISM/AGENT SPEC: NEGATIVE
SPECIMEN DESCRIPTION: NORMAL

## 2023-09-19 NOTE — RESULT ENCOUNTER NOTE
Please call pt and inform them their h pylori results are normal.  Continue current meds,  Please see if GI at [facility] has called him yet.

## 2023-10-16 ENCOUNTER — HOSPITAL ENCOUNTER (EMERGENCY)
Age: 33
Discharge: HOME | End: 2023-10-16

## 2023-10-16 ENCOUNTER — HOSPITAL ENCOUNTER (EMERGENCY)
Age: 33
Discharge: HOME | End: 2023-10-16
Payer: COMMERCIAL

## 2023-10-16 VITALS — HEART RATE: 67 BPM | RESPIRATION RATE: 17 BRPM

## 2023-10-16 VITALS
OXYGEN SATURATION: 99 % | DIASTOLIC BLOOD PRESSURE: 89 MMHG | RESPIRATION RATE: 20 BRPM | SYSTOLIC BLOOD PRESSURE: 151 MMHG | HEART RATE: 86 BPM | TEMPERATURE: 98.42 F

## 2023-10-16 VITALS
OXYGEN SATURATION: 100 % | DIASTOLIC BLOOD PRESSURE: 81 MMHG | SYSTOLIC BLOOD PRESSURE: 124 MMHG | RESPIRATION RATE: 18 BRPM | HEART RATE: 69 BPM

## 2023-10-16 VITALS
RESPIRATION RATE: 12 BRPM | SYSTOLIC BLOOD PRESSURE: 124 MMHG | HEART RATE: 72 BPM | OXYGEN SATURATION: 98 % | DIASTOLIC BLOOD PRESSURE: 81 MMHG

## 2023-10-16 VITALS — HEART RATE: 68 BPM | RESPIRATION RATE: 24 BRPM

## 2023-10-16 VITALS — HEART RATE: 66 BPM | RESPIRATION RATE: 24 BRPM

## 2023-10-16 VITALS — HEART RATE: 74 BPM | OXYGEN SATURATION: 100 % | RESPIRATION RATE: 18 BRPM

## 2023-10-16 VITALS — HEART RATE: 83 BPM | RESPIRATION RATE: 22 BRPM

## 2023-10-16 VITALS — HEART RATE: 61 BPM | RESPIRATION RATE: 22 BRPM

## 2023-10-16 VITALS — SYSTOLIC BLOOD PRESSURE: 151 MMHG | DIASTOLIC BLOOD PRESSURE: 89 MMHG | OXYGEN SATURATION: 99 %

## 2023-10-16 VITALS
OXYGEN SATURATION: 100 % | TEMPERATURE: 98 F | RESPIRATION RATE: 14 BRPM | SYSTOLIC BLOOD PRESSURE: 127 MMHG | DIASTOLIC BLOOD PRESSURE: 86 MMHG | HEART RATE: 75 BPM

## 2023-10-16 VITALS — HEART RATE: 67 BPM | RESPIRATION RATE: 20 BRPM

## 2023-10-16 VITALS — HEART RATE: 66 BPM | RESPIRATION RATE: 18 BRPM

## 2023-10-16 VITALS — HEART RATE: 61 BPM | OXYGEN SATURATION: 100 %

## 2023-10-16 VITALS — HEART RATE: 66 BPM | RESPIRATION RATE: 13 BRPM

## 2023-10-16 VITALS — OXYGEN SATURATION: 98 %

## 2023-10-16 VITALS — HEART RATE: 69 BPM | RESPIRATION RATE: 16 BRPM

## 2023-10-16 VITALS — BODY MASS INDEX: 21.6 KG/M2

## 2023-10-16 DIAGNOSIS — K58.1: ICD-10-CM

## 2023-10-16 DIAGNOSIS — Z79.899: ICD-10-CM

## 2023-10-16 DIAGNOSIS — E87.6: Primary | ICD-10-CM

## 2023-10-16 DIAGNOSIS — I45.10: ICD-10-CM

## 2023-10-16 LAB
ADD MANUAL DIFF: NO
ALANINE AMINOTRANSFERASE: 16 U/L (ref 16–63)
ALBUMIN GLOBULIN RATIO: 1.5
ALBUMIN LEVEL: 4.6 G/DL (ref 3.4–5)
ALKALINE PHOSPHATASE: 48 U/L (ref 46–116)
ANION GAP: 15.3
ASPARTATE AMINO TRANSFERASE: 11 U/L (ref 15–37)
BLOOD UREA NITROGEN: 10 MG/DL (ref 7–18)
CALCIUM: 9.6 MG/DL (ref 8.5–10.1)
CARBON DIOXIDE: 23.9 MMOL/L (ref 21–32)
CHLORIDE: 105 MMOL/L (ref 98–107)
CO2 BLD-SCNC: 23.9 MMOL/L (ref 21–32)
ESTIMATED GFR (AFRICAN AMERICA: >60 (ref 60–?)
ESTIMATED GFR (NON-AFRICAN AME: >60 (ref 60–?)
GLOBULIN: 3.1 G/DL
GLUCOSE BLD-MCNC: 100 MG/DL (ref 74–106)
HCT VFR BLD CALC: 42.9 % (ref 42–54)
HEMATOCRIT: 42.9 % (ref 42–54)
HEMOGLOBIN: 14.6 G/DL (ref 14–18)
IMMATURE GRANULOCYTES ABS AUTO: 0.02 10^3/UL (ref 0–0.03)
IMMATURE GRANULOCYTES PCT AUTO: 0.3 % (ref 0–0.5)
LYMPHOCYTES  ABSOLUTE AUTO: 1.6 10^3/UL (ref 1.2–3.8)
MCV RBC: 89.6 FL (ref 80–94)
MEAN CORPUSCULAR HEMOGLOBIN: 30.5 PG (ref 25.9–34)
MEAN CORPUSCULAR HGB CONC: 34 G/DL (ref 29.9–35.2)
MEAN CORPUSCULAR VOLUME: 89.6 FL (ref 80–94)
NT PRO B TYPE NATRIURETIC PEPT: 46 PG/ML (ref ?–450)
PLATELET # BLD: 170 10^3/UL (ref 150–450)
PLATELET COUNT: 170 10^3/UL (ref 150–450)
POTASSIUM SERPLBLD-SCNC: 3.2 MMOL/L (ref 3.5–5.1)
POTASSIUM: 3.2 MMOL/L (ref 3.5–5.1)
RED BLOOD COUNT: 4.79 10^6/UL (ref 4.7–6.1)
SODIUM BLD-SCNC: 141 MMOL/L (ref 136–145)
SODIUM: 141 MMOL/L (ref 136–145)
TOTAL PROTEIN: 7.7 G/DL (ref 6.4–8.2)
WBC # BLD: 6.5 10^3/UL (ref 4–11)
WHITE BLOOD COUNT: 6.5 10^3/UL (ref 4–11)

## 2023-10-16 PROCEDURE — 83880 ASSAY OF NATRIURETIC PEPTIDE: CPT

## 2023-10-16 PROCEDURE — 93005 ELECTROCARDIOGRAM TRACING: CPT

## 2023-10-16 PROCEDURE — 85025 COMPLETE CBC W/AUTO DIFF WBC: CPT

## 2023-10-16 PROCEDURE — 80053 COMPREHEN METABOLIC PANEL: CPT

## 2023-10-16 PROCEDURE — 86140 C-REACTIVE PROTEIN: CPT

## 2023-10-16 PROCEDURE — 84484 ASSAY OF TROPONIN QUANT: CPT

## 2023-10-16 PROCEDURE — 99285 EMERGENCY DEPT VISIT HI MDM: CPT

## 2023-10-16 PROCEDURE — 36415 COLL VENOUS BLD VENIPUNCTURE: CPT

## 2023-10-16 PROCEDURE — 71045 X-RAY EXAM CHEST 1 VIEW: CPT

## 2023-10-16 NOTE — ED.GENADUL1
HPI - General Adult
General
Chief complaint: Chest Pain
Stated complaint: BACK PAIN RADIATING TO CP
Time Seen by Provider: 10/16/23 16:12
Source: patient
Mode of arrival: walk-in
Limitations: no limitations
History of Present Illness
HPI narrative: 
patient here with a multitude of symptoms. He had  repair of a thumb ligamentous injury and had general anesthesia for that he says he just hasn't felt very good since that time. Recently he's been having symptoms of dizziness. He states he feels 
like he might pass out , he's weak and fatigued and tired. He is not been running a fever. He has irritable bowel syndrome with constipation. He was and does admit to using pain medication postoperatively. He's had several Emergency Room visits and 
multiple visits to his primary care doctor. He said to today and yesterday he had some discomfort in the middle of his back that radiates to the front of his chest and that seems to be the chief concern today. He does not have hypertension diabetes 
or elevation of his cholesterol. He is not a previous coronary artery disease. He's not had heart catheterizations or stress test of them aware of. His vitals here shows slight elevation of his blood pressure otherwise normal. 12-lead EKG was done 
on his arrival showed an incomplete right bundle-branch block but no STEMI.
Related Data
Home Medications

 Medication  Instructions  Recorded  Confirmed
eluxadoline 100 mg tablet (Viberzi) 100 mg PO BID 08/25/23 10/16/23
pantoprazole 40 mg tablet,delayed 40 mg PO BID 10/16/23 10/16/23
release   


Allergies

Allergy/AdvReac Type Severity Reaction Status Date / Time
No Known Drug Allergies Allergy   Verified 08/25/23 12:46



[facility]
Medical History (Updated 10/16/23 @ 17:49 by Gerard Raymond MD)

COVID-19
 ?U07.1 - COVID-19 (ICD-10)
GERD (gastroesophageal reflux disease)
 ?K21.9 - Gastro-esophageal reflux disease without esophagitis (ICD-10)
Ligament tear
 ?T14.8XXA - Other injury of unspecified body region, initial encounter (ICD-10)


Surgical History (Updated 08/25/23 @ 12:50 by Fabiola Youngblood NP)

History of colonoscopy
 ?Z98.890 - Other specified postprocedural states (ICD-10)


Social History (Updated 08/25/23 @ 12:48 by Fabiola Youngblood NP)
Within the past year, how often did you have a drink containing alcohol:  never 
Score interpretation:  A score less than 4 is consistent with normal alcohol consumption. 
Smoking status:  Light tobacco smoker 
Do you use any of these nicotine containing products:  vaping products 
Non-prescribed substance use:  denies use 
Previous occupational history:   
Highest level of school completed/degree received:  high school graduate 



Exam
Narrative
Exam Narrative: 
awake alert pleasant maintains good eye contact. Described all his surgeries and medication usage accurately.

Constitutional he does not appear ill or toxic moves about comfortably. Speech cognition appear to be normal his color is good his skin is warm and dry is not pale, has no diaphoresis no scleral icterus. Examination chest shows heart sounds be 
normal with no S3-S4 or murmur.

Restoril his lungs are clear with no wheezes rales or rhonchi he is not coughing and has no wheezing.

Extremities have no evidence of phlebitis or edema. Examination abdomen discloses no guarding rebound rigidity or peritoneal findings.
Constitutional
Vital Signs, click to edit/add: 

Last Vital Signs

Temp  98.5 F   10/16/23 16:12
Pulse  86   10/16/23 16:12
Resp  20   10/16/23 16:12
BP  151/89 H  10/16/23 16:12
Pulse Ox  99   10/16/23 16:12
O2 Del Method  Room Air  10/16/23 16:12




Course
Vital Signs
Vital signs: 

Vital Signs

Temperature  98.5 F   10/16/23 16:12
Pulse Rate  86   10/16/23 16:12
Respiratory Rate  20   10/16/23 16:12
Blood Pressure  151/89 H  10/16/23 16:12
Pulse Oximetry  99   10/16/23 16:12
Oxygen Delivery Method  Room Air  10/16/23 16:12



Temperature  98.5 F   10/16/23 16:12
Pulse Rate  86   10/16/23 16:12
Respiratory Rate  20   10/16/23 16:12
Blood Pressure  151/89 H  10/16/23 16:12
Pulse Oximetry  99   10/16/23 16:12
Oxygen Delivery Method  Room Air  10/16/23 16:12




Medical Decision Making
MDM Narrative
Medical decision making narrative: 
patient presents with a number different complaints. Clinical evaluation here did not disclose any abnormalities on the exam. His chest x-ray does not show any pulmonary abnormalities his EKG was sinus rhythm rate seventy-one there is no ST segment 
elevation arrhythmia or other abnormality. Troponin is normal. His blood workup to disclose hypokalemia which can cause a number symptoms and we will address that with a prescription.  He is to follow-up with his primary care doctor.
Lab Data
Labs: 

Lab Results

  10/16/23 Range/Units
  16:15 
WBC  6.5  (4.0-11.0)  10^3/uL
RBC  4.79  (4.70-6.10)  10^6/uL
Hgb  14.6  (14.0-18.0)  g/dL
Hct  42.9  (42.0-54.0)  %
MCV  89.6  (80.0-94.0)  fL
MCH  30.5  (25.9-34.0)  pg
MCHC  34.0  (29.9-35.2)  g/dL
RDW  11.9  (11.0-15.0)  %
Plt Count  170  (150-450)  10^3/uL
MPV  11.5  (9.5-13.5)  fL
Neut % (Auto)  66.0  (43.0-75.0)  %
Lymph % (Auto)  24.9  (20.5-60.0)  %
Mono % (Auto)  8.0  (1.7-12.0)  %
Eos % (Auto)  0.3 L  (0.9-7.0)  %
Baso % (Auto)  0.5  (0.2-2.0)  %
Neut # (Auto)  4.3  (1.4-6.5)  10^3/uL
Lymph # (Auto)  1.6  (1.2-3.8)  10^3/uL
Mono # (Auto)  0.5  (0.3-0.8)  10^3/uL
Eos # (Auto)  0.0  (0.0-0.7)  10^3/uL
Baso # (Auto)  0.0  (0.0-0.1)  10^3/uL
Abs Immat Gran (auto)  0.02  (0.00-0.03)  10^3/uL
Imm/Tot Granulo (auto)  0.3  (0.0-0.5)  %
Sodium  141  (136-145)  mmol/L
Potassium  3.2 L  (3.5-5.1)  mmol/L
Chloride  105  ()  mmol/L
Carbon Dioxide  23.9  (21.0-32.0)  mmol/L
Anion Gap  15.3  
BUN  10.0  (7.0-18.0)  mg/dL
Creatinine  0.91  (0.70-1.30)  mg/dL
Est GFR ( Amer)  >60  (>=60)  
Est GFR (Non-Af Amer)  >60  (>=60)  
BUN/Creatinine Ratio  11.0  
Glucose  100  ()  mg/dL
Calcium  9.6  (8.5-10.1)  mg/dL
Total Bilirubin  0.6  (0.2-1.0)  mg/dL
AST  11 L  (15-37)  U/L
ALT  16  (16-63)  U/L
Alkaline Phosphatase  48  ()  U/L
Troponin I High Sens  7.2  (4.0-76.1)  pg/mL
NT-Pro-B Natriuret Pep  46.0  (<=450.0)  pg/mL
Total Protein  7.7  (6.4-8.2)  g/dL
Albumin  4.6  (3.4-5.0)  g/dL
Globulin  3.1  g/dL
Albumin/Globulin Ratio  1.5  




Discharge Plan
Discharge
Chief Complaint: Chest Pain

Clinical Impression:
 Acute hypokalemia


Patient Disposition: Home, Self-Care

Time of Disposition Decision: 17:49

Prescriptions / Home Meds:
No Action
  Viberzi 100 mg tablet 
   100 mg PO BID 
  pantoprazole 40 mg tablet,delayed release (DR/EC) 
   40 mg PO BID 

Additional Instructions:
take potassium tablets for three days. Follow-up with primary care doctor

Stand Alone Forms:  Portal Instructions

Referrals:
Physician,Non-Staff, MD [Primary Care Provider] - 1 week

## 2023-10-16 NOTE — XR_ITS
The [address] [address] 
     [phone] 
  
  
Patient Name: 
MAUREEN AVILES 
  
MRN: [id number]    YOB: 1990    Sex: M 
Assigned Patient Location: ED.MAIN 
Current Patient Location: ER 
Accession/Order Number: [id number] 
Exam Date: 10/16/2023  16:48    Report Date: 10/16/2023  16:58 
  
At the request of: 
SUNNY FAGAN   
  
Procedure:  XR chest 1V 
  
EXAM: XR chest 1V  
  
HISTORY: pain 
  
COMPARISON: 8/25/2023  
  
TECHNIQUE: Single AP view of the chest. 
  
FINDINGS: 
  
The lungs are clear. No pleural effusion or pneumothorax. 
  
The cardiomediastinal silhouette is unremarkable. 
  
No acute osseous or soft tissue abnormality. 
  
ORDER #: [id number] XR/XR chest 1V  
IMPRESSION:  
   
1. No acute cardiopulmonary process.   
   
   
Electronically authenticated by: ISABEL REYES   Date: 10/16/2023  16:58

## 2023-10-16 NOTE — ED_ITS
HPI - General Adult    
General    
Chief complaint: Chest Pain    
Stated complaint: BACK PAIN RADIATING TO CP    
Time Seen by Provider: 10/16/23 16:12    
Source: patient    
Mode of arrival: walk-in    
Limitations: no limitations    
History of Present Illness    
HPI narrative:     
patient here with a multitude of symptoms. He had  repair of a thumb ligamentous  
injury and had general anesthesia for that he says he just hasn't felt very good  
since that time. Recently he's been having symptoms of dizziness. He states he   
feels like he might pass out , he's weak and fatigued and tired. He is not been   
running a fever. He has irritable bowel syndrome with constipation. He was and   
does admit to using pain medication postoperatively. He's had several Emergency   
Room visits and multiple visits to his primary care doctor. He said to today and  
yesterday he had some discomfort in the middle of his back that radiates to the   
front of his chest and that seems to be the chief concern today. He does not   
have hypertension diabetes or elevation of his cholesterol. He is not a previous  
coronary artery disease. He's not had heart catheterizations or stress test of   
them aware of. His vitals here shows slight elevation of his blood pressure   
otherwise normal. 12-lead EKG was done on his arrival showed an incomplete right  
bundle-branch block but no STEMI.    
Related Data    
                                Home Medications    
    
    
    
 Medication  Instructions  Recorded  Confirmed    
     
eluxadoline 100 mg tablet (Viberzi) 100 mg PO BID 08/25/23 10/16/23    
     
pantoprazole 40 mg tablet,delayed 40 mg PO BID 10/16/23 10/16/23    
    
release       
    
    
    
                                    Allergies    
    
    
    
Allergy/AdvReac Type Severity Reaction Status Date / Time    
     
No Known Drug Allergies Allergy   Verified 08/25/23 12:46    
    
    
    
    
[facility]    
Medical History (Updated 10/16/23 @ 17:49 by Gerard Raymond MD)    
    
COVID-19    
   ?U07.1 - COVID-19 (ICD-10)    
GERD (gastroesophageal reflux disease)    
   ?K21.9 - Gastro-esophageal reflux disease without esophagitis (ICD-10)    
Ligament tear    
   ?T14.8XXA - Other injury of unspecified body region, initial encounter (ICD-  
   10)    
    
    
Surgical History (Updated 08/25/23 @ 12:50 by Fabiola Youngblood NP)    
    
History of colonoscopy    
   ?Z98.890 - Other specified postprocedural states (ICD-10)    
    
    
Social History (Updated 08/25/23 @ 12:48 by Fabiola Youngblood NP)    
Within the past year, how often did you have a drink containing alcohol:  never     
Score interpretation:  A score less than 4 is consistent with normal alcohol   
consumption.     
Smoking status:  Light tobacco smoker     
Do you use any of these nicotine containing products:  vaping products     
Non-prescribed substance use:  denies use     
Previous occupational history:       
Highest level of school completed/degree received:  high school graduate     
    
    
    
Exam    
Narrative    
Exam Narrative:     
awake alert pleasant maintains good eye contact. Described all his surgeries and  
medication usage accurately.    
    
Constitutional he does not appear ill or toxic moves about comfortably. Speech   
cognition appear to be normal his color is good his skin is warm and dry is not   
pale, has no diaphoresis no scleral icterus. Examination chest shows heart   
sounds be normal with no S3-S4 or murmur.    
    
Restoril his lungs are clear with no wheezes rales or rhonchi he is not coughing  
and has no wheezing.    
    
Extremities have no evidence of phlebitis or edema. Examination abdomen   
discloses no guarding rebound rigidity or peritoneal findings.    
Constitutional    
Vital Signs, click to edit/add:     
    
                                Last Vital Signs    
    
    
    
Temp  98.5 F   10/16/23 16:12    
     
Pulse  86   10/16/23 16:12    
     
Resp  20   10/16/23 16:12    
     
BP  151/89 H  10/16/23 16:12    
     
Pulse Ox  99   10/16/23 16:12    
     
O2 Del Method  Room Air  10/16/23 16:12    
    
    
    
    
    
Course    
Vital Signs    
Vital signs:     
    
                                   Vital Signs    
    
    
    
Temperature  98.5 F   10/16/23 16:12    
     
Pulse Rate  86   10/16/23 16:12    
     
Respiratory Rate  20   10/16/23 16:12    
     
Blood Pressure  151/89 H  10/16/23 16:12    
     
Pulse Oximetry  99   10/16/23 16:12    
     
Oxygen Delivery Method  Room Air  10/16/23 16:12    
    
    
                                            
    
    
    
Temperature  98.5 F   10/16/23 16:12    
     
Pulse Rate  86   10/16/23 16:12    
     
Respiratory Rate  20   10/16/23 16:12    
     
Blood Pressure  151/89 H  10/16/23 16:12    
     
Pulse Oximetry  99   10/16/23 16:12    
     
Oxygen Delivery Method  Room Air  10/16/23 16:12    
    
    
    
    
    
Medical Decision Making    
MDM Narrative    
Medical decision making narrative:     
patient presents with a number different complaints. Clinical evaluation here   
did not disclose any abnormalities on the exam. His chest x-ray does not show   
any pulmonary abnormalities his EKG was sinus rhythm rate seventy-one there is   
no ST segment elevation arrhythmia or other abnormality. Troponin is normal. His  
blood workup to disclose hypokalemia which can cause a number symptoms and we   
will address that with a prescription.  He is to follow-up with his primary care  
doctor.    
Lab Data    
Labs:     
    
                                   Lab Results    
    
    
    
  10/16/23 Range/Units    
    
  16:15     
     
WBC  6.5  (4.0-11.0)  10^3/uL    
     
RBC  4.79  (4.70-6.10)  10^6/uL    
     
Hgb  14.6  (14.0-18.0)  g/dL    
     
Hct  42.9  (42.0-54.0)  %    
     
MCV  89.6  (80.0-94.0)  fL    
     
MCH  30.5  (25.9-34.0)  pg    
     
MCHC  34.0  (29.9-35.2)  g/dL    
     
RDW  11.9  (11.0-15.0)  %    
     
Plt Count  170  (150-450)  10^3/uL    
     
MPV  11.5  (9.5-13.5)  fL    
     
Neut % (Auto)  66.0  (43.0-75.0)  %    
     
Lymph % (Auto)  24.9  (20.5-60.0)  %    
     
Mono % (Auto)  8.0  (1.7-12.0)  %    
     
Eos % (Auto)  0.3 L  (0.9-7.0)  %    
     
Baso % (Auto)  0.5  (0.2-2.0)  %    
     
Neut # (Auto)  4.3  (1.4-6.5)  10^3/uL    
     
Lymph # (Auto)  1.6  (1.2-3.8)  10^3/uL    
     
Mono # (Auto)  0.5  (0.3-0.8)  10^3/uL    
     
Eos # (Auto)  0.0  (0.0-0.7)  10^3/uL    
     
Baso # (Auto)  0.0  (0.0-0.1)  10^3/uL    
     
Abs Immat Gran (auto)  0.02  (0.00-0.03)  10^3/uL    
     
Imm/Tot Granulo (auto)  0.3  (0.0-0.5)  %    
     
Sodium  141  (136-145)  mmol/L    
     
Potassium  3.2 L  (3.5-5.1)  mmol/L    
     
Chloride  105  ()  mmol/L    
     
Carbon Dioxide  23.9  (21.0-32.0)  mmol/L    
     
Anion Gap  15.3      
     
BUN  10.0  (7.0-18.0)  mg/dL    
     
Creatinine  0.91  (0.70-1.30)  mg/dL    
     
Est GFR ( Amer)  >60  (>=60)      
     
Est GFR (Non-Af Amer)  >60  (>=60)      
     
BUN/Creatinine Ratio  11.0      
     
Glucose  100  ()  mg/dL    
     
Calcium  9.6  (8.5-10.1)  mg/dL    
     
Total Bilirubin  0.6  (0.2-1.0)  mg/dL    
     
AST  11 L  (15-37)  U/L    
     
ALT  16  (16-63)  U/L    
     
Alkaline Phosphatase  48  ()  U/L    
     
Troponin I High Sens  7.2  (4.0-76.1)  pg/mL    
     
NT-Pro-B Natriuret Pep  46.0  (<=450.0)  pg/mL    
     
Total Protein  7.7  (6.4-8.2)  g/dL    
     
Albumin  4.6  (3.4-5.0)  g/dL    
     
Globulin  3.1  g/dL    
     
Albumin/Globulin Ratio  1.5      
    
    
    
    
    
Discharge Plan    
Discharge    
Chief Complaint: Chest Pain    
    
Clinical Impression:    
 Acute hypokalemia    
    
    
Patient Disposition: Home, Self-Care    
    
Time of Disposition Decision: 17:49    
    
Prescriptions / Home Meds:    
No Action    
  Viberzi 100 mg tablet     
   100 mg PO BID     
  pantoprazole 40 mg tablet,delayed release (DR/EC)     
   40 mg PO BID     
    
Additional Instructions:    
take potassium tablets for three days. Follow-up with primary care doctor    
    
Stand Alone Forms:  Portal Instructions    
    
Referrals:    
Physician,Non-Staff, MD [Primary Care Provider] - 1 week

## 2023-10-16 NOTE — ECG_ITS
[facility] 
                                        
                                       Test Date:    2023-10-16 
Pat Name:     MAUREEN AVILES             Department:    
Patient ID:   [id number]               Room:         - 
Gender:       Male                     Technician:    
:          1990               Requested By:  
Order Number: [id number]              Reading MD:   KASH GARCIA 
                                 Measurements 
Intervals                              Axis           
Rate:         71                       P:            90 
IN:           140                      QRS:          89 
QRSD:         98                       T:            17 
QT:           372                                     
QTc:          394                                     
                           Interpretive Statements 
1100 Sinus rhythm 
2440 Incomplete right bundle branch block 
4068 Nonspecific Twave abnormality 
5211 Minimal voltage criteria for LVH, may be normal variant 
9130 **  borderline ECG  ** 
No previous ECG available for comparison 
Electronically Signed On 10- 20:06:24 EDT by KASH GARCIA

## 2023-10-20 ENCOUNTER — HOSPITAL ENCOUNTER (OUTPATIENT)
Age: 33
Discharge: HOME | End: 2023-10-20
Payer: COMMERCIAL

## 2023-10-20 DIAGNOSIS — R00.2 PALPITATIONS: ICD-10-CM

## 2023-10-20 PROCEDURE — 93243 EXT ECG>48HR<7D SCAN A/R: CPT

## 2023-11-16 ENCOUNTER — HOSPITAL ENCOUNTER (OUTPATIENT)
Dept: OCCUPATIONAL THERAPY | Age: 33
Setting detail: THERAPIES SERIES
Discharge: HOME | End: 2023-11-16
Payer: COMMERCIAL

## 2023-11-16 PROCEDURE — 97140 MANUAL THERAPY 1/> REGIONS: CPT

## 2023-11-16 PROCEDURE — 97018 PARAFFIN BATH THERAPY: CPT

## 2023-11-16 PROCEDURE — 97166 OT EVAL MOD COMPLEX 45 MIN: CPT

## 2023-11-16 PROCEDURE — 97110 THERAPEUTIC EXERCISES: CPT

## 2023-11-16 NOTE — PLAN OF CARE
Deviation 45 29   Radial Deviation 35 15      /PINCH STRENGTH  (measured in #) RIGHT LEFT    135 40*   2 pt pinch 23 1*   3 pt pinch 31 3*   Key/lateral pinch 26 1*   * Indicates pain with testing    Sensation throughout and surrounding incision intact with ability to decect 2.83 monofilament. Functional Impairment:  [] Mobility:    [] Current [] Goal [] Discharge  [x] Carry: [x] Current [x] Goal [] Discharge  [] Body Position:  [] Current [] Goal [] Discharge  [] Self Care:    [] Current [] Goal [] Discharge  [] Other:    [] Current [] Goal [] Discharge    Functional Impairment Current: Functional Impairment Goal:   [] 0%(CH)    [] 0%(CH)  [] 1-19%(CI)     [x] 1-19%(CI)  [x] 20-39%(CJ)    [] 20-39%(CJ)  [] 40-59%(CK)    [] 40-59%(CK)   [] 60-79%(CL)     [] 60-79%(CL)    [] 80-99%(CM)    [] 80-99%(CM)    [] 100%(CN)    [] 100%(CN)  [] NA  [] NA         Functional Impairment determined by:  [] Clinical Judgment   [x] Outcome Measure: DASH - 22%        Patient Goals:   Full motion of L thumb    Problems:     [x] Pain  [x] Adherent Scar    [x] ROM  [] Edema  [x] Strength  [x] Open Wound  [x] Function  [x] Coordination               [] Sensation            [] Falls: History/Risk of      Goals:   Short Term Goals  Time Frame for Short Term Goals: 4 weeks  Short Term Goal 1: Patient to be educated on HEP to improve L hand/wrist function and strength. Short Term Goal 2: Patient to improve L wrist UD to 35 and RD to 25 to improve wrist ROM for functional use. Short Term Goal 3: Patient to improve L thumb MP to 20 and IP to 35 to improve ROM for functional use. Long Term Goals  Time Frame for Long Term Goals : 6 weeks  Long Term Goal 1: Patient to state <10% impairment throughout daily tasks, as measured by the DASH. Long Term Goal 2: Patient to improve L tumb MP to 30 and IP to 60, PAB to 15.0 and RAB to 15.0 cm to improve functional use of hand throughout ADL.   Long Term Goal 3: Patient to improve L

## 2023-11-17 ENCOUNTER — HOSPITAL ENCOUNTER (OUTPATIENT)
Dept: OCCUPATIONAL THERAPY | Age: 33
Discharge: HOME | End: 2023-11-17
Payer: COMMERCIAL

## 2023-11-17 PROCEDURE — 97140 MANUAL THERAPY 1/> REGIONS: CPT

## 2023-11-17 PROCEDURE — 97035 APP MDLTY 1+ULTRASOUND EA 15: CPT

## 2023-11-17 PROCEDURE — 97018 PARAFFIN BATH THERAPY: CPT

## 2023-11-20 ENCOUNTER — HOSPITAL ENCOUNTER (OUTPATIENT)
Dept: OCCUPATIONAL THERAPY | Age: 33
Setting detail: THERAPIES SERIES
Discharge: HOME | End: 2023-11-20
Payer: COMMERCIAL

## 2023-11-20 PROCEDURE — 97110 THERAPEUTIC EXERCISES: CPT

## 2023-11-20 PROCEDURE — 97140 MANUAL THERAPY 1/> REGIONS: CPT

## 2023-11-20 PROCEDURE — 97018 PARAFFIN BATH THERAPY: CPT

## 2023-11-21 ENCOUNTER — HOSPITAL ENCOUNTER (OUTPATIENT)
Dept: OCCUPATIONAL THERAPY | Age: 33
Setting detail: THERAPIES SERIES
Discharge: HOME | End: 2023-11-21
Payer: COMMERCIAL

## 2023-11-21 PROCEDURE — 97110 THERAPEUTIC EXERCISES: CPT

## 2023-11-21 PROCEDURE — 97140 MANUAL THERAPY 1/> REGIONS: CPT

## 2023-11-21 PROCEDURE — 97018 PARAFFIN BATH THERAPY: CPT

## 2023-11-22 ENCOUNTER — HOSPITAL ENCOUNTER (OUTPATIENT)
Dept: OCCUPATIONAL THERAPY | Age: 33
Setting detail: THERAPIES SERIES
Discharge: HOME | End: 2023-11-22
Payer: COMMERCIAL

## 2023-11-22 PROCEDURE — 97140 MANUAL THERAPY 1/> REGIONS: CPT

## 2023-11-22 PROCEDURE — 97110 THERAPEUTIC EXERCISES: CPT

## 2023-11-22 PROCEDURE — 97018 PARAFFIN BATH THERAPY: CPT

## 2023-11-22 NOTE — PROGRESS NOTES
hold  __ Other        Electronically signed by SILVANO Wilkerson/L, [facility]   11/22/2023 12:05 PM

## 2023-11-28 ENCOUNTER — HOSPITAL ENCOUNTER (OUTPATIENT)
Dept: OCCUPATIONAL THERAPY | Age: 33
Setting detail: THERAPIES SERIES
Discharge: HOME | End: 2023-11-28
Payer: COMMERCIAL

## 2023-11-28 PROCEDURE — 97140 MANUAL THERAPY 1/> REGIONS: CPT

## 2023-11-28 PROCEDURE — 97110 THERAPEUTIC EXERCISES: CPT

## 2023-11-28 PROCEDURE — 97018 PARAFFIN BATH THERAPY: CPT

## 2023-11-29 ENCOUNTER — OFFICE VISIT (OUTPATIENT)
Dept: PRIMARY CARE CLINIC | Age: 33
End: 2023-11-29
Payer: COMMERCIAL

## 2023-11-29 ENCOUNTER — HOSPITAL ENCOUNTER (OUTPATIENT)
Dept: OCCUPATIONAL THERAPY | Age: 33
Setting detail: THERAPIES SERIES
Discharge: HOME | End: 2023-11-29
Payer: COMMERCIAL

## 2023-11-29 VITALS
OXYGEN SATURATION: 98 % | BODY MASS INDEX: 23.2 KG/M2 | WEIGHT: 165.7 LBS | TEMPERATURE: 98.5 F | HEART RATE: 72 BPM | DIASTOLIC BLOOD PRESSURE: 74 MMHG | SYSTOLIC BLOOD PRESSURE: 118 MMHG | HEIGHT: 71 IN | RESPIRATION RATE: 20 BRPM

## 2023-11-29 DIAGNOSIS — Z76.89 ENCOUNTER TO ESTABLISH CARE WITH NEW DOCTOR: ICD-10-CM

## 2023-11-29 DIAGNOSIS — J30.89 CHRONIC NON-SEASONAL ALLERGIC RHINITIS: ICD-10-CM

## 2023-11-29 DIAGNOSIS — K52.9 CHRONIC DIARRHEA: Primary | ICD-10-CM

## 2023-11-29 DIAGNOSIS — K21.00 GASTROESOPHAGEAL REFLUX DISEASE WITH ESOPHAGITIS WITHOUT HEMORRHAGE: ICD-10-CM

## 2023-11-29 PROCEDURE — 97140 MANUAL THERAPY 1/> REGIONS: CPT

## 2023-11-29 PROCEDURE — G8427 DOCREV CUR MEDS BY ELIG CLIN: HCPCS | Performed by: NURSE PRACTITIONER

## 2023-11-29 PROCEDURE — G8420 CALC BMI NORM PARAMETERS: HCPCS | Performed by: NURSE PRACTITIONER

## 2023-11-29 PROCEDURE — 97110 THERAPEUTIC EXERCISES: CPT

## 2023-11-29 PROCEDURE — 1036F TOBACCO NON-USER: CPT | Performed by: NURSE PRACTITIONER

## 2023-11-29 PROCEDURE — G8484 FLU IMMUNIZE NO ADMIN: HCPCS | Performed by: NURSE PRACTITIONER

## 2023-11-29 PROCEDURE — 97018 PARAFFIN BATH THERAPY: CPT

## 2023-11-29 PROCEDURE — 99204 OFFICE O/P NEW MOD 45 MIN: CPT | Performed by: NURSE PRACTITIONER

## 2023-11-29 RX ORDER — TIZANIDINE HYDROCHLORIDE 4 MG/1
CAPSULE, GELATIN COATED ORAL
COMMUNITY
Start: 2023-11-27 | End: 2023-11-29 | Stop reason: ALTCHOICE

## 2023-11-29 RX ORDER — OMEPRAZOLE 40 MG/1
40 CAPSULE, DELAYED RELEASE ORAL 2 TIMES DAILY
COMMUNITY
Start: 2023-11-14

## 2023-11-29 RX ORDER — TRIAMCINOLONE ACETONIDE 40 MG/ML
60 INJECTION, SUSPENSION INTRA-ARTICULAR; INTRAMUSCULAR ONCE
Status: COMPLETED | OUTPATIENT
Start: 2023-11-29 | End: 2023-11-29

## 2023-11-29 RX ORDER — MONTELUKAST SODIUM 10 MG/1
10 TABLET ORAL NIGHTLY
Qty: 90 TABLET | Refills: 0 | Status: SHIPPED | OUTPATIENT
Start: 2023-11-29

## 2023-11-29 RX ORDER — COLESEVELAM 180 1/1
1250 TABLET ORAL 2 TIMES DAILY WITH MEALS
Qty: 120 TABLET | Refills: 0 | Status: SHIPPED | OUTPATIENT
Start: 2023-11-29

## 2023-11-29 RX ADMIN — TRIAMCINOLONE ACETONIDE 60 MG: 40 INJECTION, SUSPENSION INTRA-ARTICULAR; INTRAMUSCULAR at 16:55

## 2023-11-29 SDOH — ECONOMIC STABILITY: INCOME INSECURITY: HOW HARD IS IT FOR YOU TO PAY FOR THE VERY BASICS LIKE FOOD, HOUSING, MEDICAL CARE, AND HEATING?: PATIENT DECLINED

## 2023-11-29 SDOH — ECONOMIC STABILITY: FOOD INSECURITY: WITHIN THE PAST 12 MONTHS, THE FOOD YOU BOUGHT JUST DIDN'T LAST AND YOU DIDN'T HAVE MONEY TO GET MORE.: PATIENT DECLINED

## 2023-11-29 SDOH — ECONOMIC STABILITY: FOOD INSECURITY: WITHIN THE PAST 12 MONTHS, YOU WORRIED THAT YOUR FOOD WOULD RUN OUT BEFORE YOU GOT MONEY TO BUY MORE.: PATIENT DECLINED

## 2023-11-29 SDOH — ECONOMIC STABILITY: HOUSING INSECURITY
IN THE LAST 12 MONTHS, WAS THERE A TIME WHEN YOU DID NOT HAVE A STEADY PLACE TO SLEEP OR SLEPT IN A SHELTER (INCLUDING NOW)?: PATIENT REFUSED

## 2023-11-29 ASSESSMENT — PATIENT HEALTH QUESTIONNAIRE - PHQ9
SUM OF ALL RESPONSES TO PHQ QUESTIONS 1-9: 0
1. LITTLE INTEREST OR PLEASURE IN DOING THINGS: 0
SUM OF ALL RESPONSES TO PHQ QUESTIONS 1-9: 0
2. FEELING DOWN, DEPRESSED OR HOPELESS: 0
SUM OF ALL RESPONSES TO PHQ9 QUESTIONS 1 & 2: 0

## 2023-11-29 NOTE — PROGRESS NOTES
After obtaining consent, and per orders of Dr. Alethea Olszewski Might CNP, injection of Kenalog 60 mg given in Left deltoid by David Arce LPN. Patient instructed to remain in clinic for 20 minutes afterwards, and to report any adverse reaction to me immediately.

## 2023-11-29 NOTE — PROGRESS NOTES
Name: Kerline Cole  : 1990         Chief Complaint:     Chief Complaint   Patient presents with    Establish Care     Previous PCP, Lorna Aguirre CNP. Temporomandibular Joint Pain     ENT dx TMJ,multiple symptoms,HA,neck pain, vertigo,dysphagia. Patient stopped vaping 3 day symptoms have started to subside. History of Present Illness:      Kerline Cole is a 35 y.o.  male who presents with Establish Care (Previous PCP, Lorna Aguirre CNP.) and Temporomandibular Joint Pain (ENT dx TMJ,multiple symptoms,HA,neck pain, vertigo,dysphagia. Patient stopped vaping 3 day symptoms have started to subside.)      Zee Samson is here today to establish care. He tells me that he said quite a bit of health issues since having surgery in 2023 to repair a tendon that was injured in his hand during an occupational related accident. Previously he had dealt with what he describes as irritable bowel syndrome with diarrhea which was somewhat successfully treated with Viberzi for many years. Other than that he states he was relatively healthy. He has been having a lot of issues with problems swallowing and what sounds to be quite a bit of inflammation causing mucus production. He recently stopped vaping which has helped with some symptoms. He was seen by ENT who told him he had TMJ and GI with no definitive answers. He did trial pantoprazole but found that omeprazole was more helpful. Diarrhea   This is a chronic problem. The current episode started more than 1 year ago. The problem occurs 2 to 4 times per day. The problem has been waxing and waning. The stool consistency is described as Watery. The patient states that diarrhea does not awaken him from sleep. Associated symptoms include abdominal pain, bloating, coughing and headaches. Pertinent negatives include no arthralgias, chills, fever, increased  flatus, myalgias, sweats, URI, vomiting or weight loss. Exacerbated by: EATING.  There are no known

## 2023-11-29 NOTE — PATIENT INSTRUCTIONS
SURVEY:     You may be receiving a survey from [employer] regarding your visit today. Please complete the survey to enable us to provide the highest quality of care to you and your family. If you cannot score us a very good on any question, please call the office to discuss how we could have made your experience a very good one.      Thank you,    Argelia Hassan, APRN-MYRANDA Velasquez, APRN-CNP  Ashley Arboleda, MICHAEL Painter, CMA  Trego, CMA  Verónica, CMA  Gloria, PCA  Laila, PM

## 2023-11-30 PROBLEM — J30.89 CHRONIC NON-SEASONAL ALLERGIC RHINITIS: Status: ACTIVE | Noted: 2023-11-30

## 2023-11-30 PROBLEM — K52.9 CHRONIC DIARRHEA: Status: ACTIVE | Noted: 2023-11-30

## 2023-11-30 PROBLEM — K20.90 ESOPHAGITIS: Status: ACTIVE | Noted: 2023-11-30

## 2023-11-30 ASSESSMENT — ENCOUNTER SYMPTOMS
STRIDOR: 0
DIARRHEA: 1
RHINORRHEA: 0
SINUS PRESSURE: 0
SINUS PAIN: 0
HOARSE VOICE: 1
VOMITING: 0
SORE THROAT: 1
EYE ITCHING: 0
BLOATING: 1
WATER BRASH: 1
SWOLLEN GLANDS: 0
NAUSEA: 0
FLATUS: 0
TROUBLE SWALLOWING: 1
SHORTNESS OF BREATH: 0
HEARTBURN: 0
ABDOMINAL PAIN: 1
WHEEZING: 0
BELCHING: 1
CONSTIPATION: 0
CHOKING: 1
GLOBUS SENSATION: 1
COUGH: 1

## 2023-12-01 ENCOUNTER — HOSPITAL ENCOUNTER (OUTPATIENT)
Dept: OCCUPATIONAL THERAPY | Age: 33
Discharge: HOME | End: 2023-12-01
Payer: COMMERCIAL

## 2023-12-01 PROCEDURE — 97110 THERAPEUTIC EXERCISES: CPT

## 2023-12-01 PROCEDURE — 97140 MANUAL THERAPY 1/> REGIONS: CPT

## 2023-12-01 PROCEDURE — 97018 PARAFFIN BATH THERAPY: CPT

## 2023-12-04 ENCOUNTER — HOSPITAL ENCOUNTER (OUTPATIENT)
Dept: OCCUPATIONAL THERAPY | Age: 33
Setting detail: THERAPIES SERIES
Discharge: HOME | End: 2023-12-04
Payer: COMMERCIAL

## 2023-12-04 PROCEDURE — 97018 PARAFFIN BATH THERAPY: CPT

## 2023-12-04 PROCEDURE — 97140 MANUAL THERAPY 1/> REGIONS: CPT

## 2023-12-04 PROCEDURE — 97110 THERAPEUTIC EXERCISES: CPT

## 2023-12-06 ENCOUNTER — HOSPITAL ENCOUNTER (OUTPATIENT)
Dept: OCCUPATIONAL THERAPY | Age: 33
Setting detail: THERAPIES SERIES
Discharge: HOME | End: 2023-12-06
Payer: COMMERCIAL

## 2023-12-06 PROCEDURE — 97035 APP MDLTY 1+ULTRASOUND EA 15: CPT

## 2023-12-06 PROCEDURE — 97110 THERAPEUTIC EXERCISES: CPT

## 2023-12-06 PROCEDURE — 97140 MANUAL THERAPY 1/> REGIONS: CPT

## 2023-12-08 ENCOUNTER — HOSPITAL ENCOUNTER (OUTPATIENT)
Dept: OCCUPATIONAL THERAPY | Age: 33
Setting detail: THERAPIES SERIES
Discharge: HOME | End: 2023-12-08
Payer: COMMERCIAL

## 2023-12-08 PROCEDURE — 97014 ELECTRIC STIMULATION THERAPY: CPT

## 2023-12-08 PROCEDURE — 97140 MANUAL THERAPY 1/> REGIONS: CPT

## 2023-12-08 PROCEDURE — 97018 PARAFFIN BATH THERAPY: CPT

## 2023-12-11 ENCOUNTER — HOSPITAL ENCOUNTER (OUTPATIENT)
Dept: OCCUPATIONAL THERAPY | Age: 33
Discharge: HOME | End: 2023-12-11
Payer: COMMERCIAL

## 2023-12-11 PROCEDURE — 97110 THERAPEUTIC EXERCISES: CPT

## 2023-12-11 PROCEDURE — 97018 PARAFFIN BATH THERAPY: CPT

## 2023-12-11 PROCEDURE — 97140 MANUAL THERAPY 1/> REGIONS: CPT

## 2023-12-13 ENCOUNTER — HOSPITAL ENCOUNTER (OUTPATIENT)
Dept: OCCUPATIONAL THERAPY | Age: 33
Setting detail: THERAPIES SERIES
Discharge: HOME | End: 2023-12-13
Payer: COMMERCIAL

## 2023-12-13 ENCOUNTER — OFFICE VISIT (OUTPATIENT)
Dept: PRIMARY CARE CLINIC | Age: 33
End: 2023-12-13
Payer: COMMERCIAL

## 2023-12-13 VITALS
SYSTOLIC BLOOD PRESSURE: 110 MMHG | RESPIRATION RATE: 16 BRPM | OXYGEN SATURATION: 99 % | BODY MASS INDEX: 23.13 KG/M2 | TEMPERATURE: 97.6 F | DIASTOLIC BLOOD PRESSURE: 76 MMHG | HEART RATE: 68 BPM | WEIGHT: 165.2 LBS

## 2023-12-13 DIAGNOSIS — J01.40 ACUTE NON-RECURRENT PANSINUSITIS: Primary | ICD-10-CM

## 2023-12-13 DIAGNOSIS — R06.02 SHORTNESS OF BREATH: ICD-10-CM

## 2023-12-13 PROCEDURE — 99214 OFFICE O/P EST MOD 30 MIN: CPT | Performed by: NURSE PRACTITIONER

## 2023-12-13 PROCEDURE — G8427 DOCREV CUR MEDS BY ELIG CLIN: HCPCS | Performed by: NURSE PRACTITIONER

## 2023-12-13 PROCEDURE — G8420 CALC BMI NORM PARAMETERS: HCPCS | Performed by: NURSE PRACTITIONER

## 2023-12-13 PROCEDURE — 97018 PARAFFIN BATH THERAPY: CPT

## 2023-12-13 PROCEDURE — 97530 THERAPEUTIC ACTIVITIES: CPT

## 2023-12-13 PROCEDURE — 1036F TOBACCO NON-USER: CPT | Performed by: NURSE PRACTITIONER

## 2023-12-13 PROCEDURE — G8484 FLU IMMUNIZE NO ADMIN: HCPCS | Performed by: NURSE PRACTITIONER

## 2023-12-13 PROCEDURE — 97140 MANUAL THERAPY 1/> REGIONS: CPT

## 2023-12-13 RX ORDER — AMOXICILLIN AND CLAVULANATE POTASSIUM 875; 125 MG/1; MG/1
1 TABLET, FILM COATED ORAL 2 TIMES DAILY
Qty: 20 TABLET | Refills: 0 | Status: SHIPPED | OUTPATIENT
Start: 2023-12-13 | End: 2023-12-23

## 2023-12-13 ASSESSMENT — PATIENT HEALTH QUESTIONNAIRE - PHQ9
SUM OF ALL RESPONSES TO PHQ QUESTIONS 1-9: 0
2. FEELING DOWN, DEPRESSED OR HOPELESS: 0
SUM OF ALL RESPONSES TO PHQ9 QUESTIONS 1 & 2: 0
1. LITTLE INTEREST OR PLEASURE IN DOING THINGS: 0
SUM OF ALL RESPONSES TO PHQ QUESTIONS 1-9: 0

## 2023-12-13 NOTE — PROGRESS NOTES
Tx Modality       Electrical Stim:          Ultrasound: __1.2_ W/cm2 x _8__ mins  Duty factor: _x_100%  __50%  __20% __10%  Head size:   MHz: __1mHz __2 mHz  _x_3mHz  Location:L Thumb MP joint for stiffness. Patient tolerated well c skin intact pre and post treatment. 10 minutes Hot Pack: L hand for stiffness. Patient tolerated well c skin intact pre and post treatment. Combined c paraffin. 10 minutes Paraffin: L hand for stiffness. Patient tolerated well c skin intact pre and post treatment. Combined c HP. Cold Pack:      1 minute Dry Needling: __1_\" needle to MP joint of L thumb for stiffness. Patient tolerated well c skin intact pre and post treatment. _x__No manipulation/static  ___Basic Manipulation  ___Pistoning Manipulation  ___Needle Rotation  ___Tenting   Consents obtained. GOALS   Time Frame for Long Term Goals : 6 weeks       Long Term Goal 1: Patient to state <10% impairment throughout daily tasks, as measured by the DASH. Continue []Met  [x]Partially met  []Not met   Long Term Goal 2: Patient to improve L tumb MP to 30 and IP to 60, PAB to 15.0 and RAB to 15.0 cm to improve functional use of hand throughout ADL. Continue  PAB -  Met  RAB - Met  MP: 28  IP: 41    []Met  [x]Partially met  []Not met   Long Term Goal 3: Patient to improve L  to 100#, 2 pt to 15#, 2 pt to 17# and lateral to 19# with min c/o pain to improve hand strength s/p injury. Continue   []Met  [x]Partially met  []Not met   Time Frame for Short Term Goals: 4 weeks       Short Term Goal 1: Patient to be educated on HEP to improve L hand/wrist function and strength. Patient educated on initial HEP with light putty and ROM to wrist and thumb. Continue to update as patient progresses. [x]Met  []Partially met  []Not met   Short Term Goal 2: Patient to improve L wrist UD to 35 and RD to 25 to improve wrist ROM for functional use.  Met [x]Met  []Partially met  []Not met   Short Term Goal 3: Patient to

## 2023-12-13 NOTE — PROGRESS NOTES
[facility] PHYSICIANS  [place], [address] PRIMARY CARE  [address] [facility] [address] [facility]  Dept: [phone]  Dept Fax: [phone]      Name: Alexandra Butler  : 1990         Chief Complaint:     Chief Complaint   Patient presents with    Congestion     X 2 weeks. Drainage     X 2 weeks. Headache     X 2 weeks. Sinus Problem     X 2 weeks. History of Present Illness:      Alexandra Butler is a 35 y.o.  male who presents with Congestion (X 2 weeks. ), Drainage (X 2 weeks. ), Headache (X 2 weeks. ), and Sinus Problem (X 2 weeks. )      WINSTON Zuñiga is here today for complaints of congestion and drainage for a month. He has rhinorrhea. He feels pressure in his chest.  He states he has increased vertigo. He has pressure in his ears and states they feel \"closed up\". He has nasal passage dryness. He denies a cough. He has increased post nasal drip. Denies a sore throat. He denies using any OTC medications or nasal sprays. He received a Kenalog shot in the office a month ago with minimal improvement. H has seen an ENT in the past and states they were not helpful. He is past , vaping and smoking history. Past Medical History:     Past Medical History:   Diagnosis Date    GERD (gastroesophageal reflux disease)       Reviewed all health maintenance requirements and ordered appropriate tests  There are no preventive care reminders to display for this patient. Past Surgical History:     Past Surgical History:   Procedure Laterality Date    COLONOSCOPY  2016    -bx    UPPER GASTROINTESTINAL ENDOSCOPY  2016    Dr. Jeffy Nichole- Biopsy = Negative for H Pylori. Medications:       Prior to Admission medications    Medication Sig Start Date End Date Taking?  Authorizing Provider   amoxicillin-clavulanate (AUGMENTIN) 875-125 MG per tablet Take 1 tablet by mouth 2 times daily for 10 days 23 Yes SYDNEE Barney -

## 2023-12-13 NOTE — PATIENT INSTRUCTIONS
SURVEY:     You may be receiving a survey from [employer] regarding your visit today. Please complete the survey to enable us to provide the highest quality of care to you and your family. If you cannot score us a very good on any question, please call the office to discuss how we could have made your experience a very good one.      Thank you,    Padma Hassan, APRN-CNP  Satish Wylie, APRN-CNP  Beatrice Holguin, LPN  David Duque, CMA  Candelaria Ogden, CMA  Verónica, CMA  Gloria, PCA  Laila, PM

## 2023-12-14 ENCOUNTER — TELEPHONE (OUTPATIENT)
Dept: PRIMARY CARE CLINIC | Age: 33
End: 2023-12-14

## 2023-12-14 ENCOUNTER — HOSPITAL ENCOUNTER (OUTPATIENT)
Age: 33
Discharge: HOME | End: 2023-12-16
Payer: COMMERCIAL

## 2023-12-14 ENCOUNTER — HOSPITAL ENCOUNTER (OUTPATIENT)
Dept: GENERAL RADIOLOGY | Age: 33
Discharge: HOME | End: 2023-12-16
Payer: COMMERCIAL

## 2023-12-14 DIAGNOSIS — R06.02 SHORTNESS OF BREATH: Primary | ICD-10-CM

## 2023-12-14 DIAGNOSIS — R06.02 SHORTNESS OF BREATH: ICD-10-CM

## 2023-12-14 PROCEDURE — 71046 X-RAY EXAM CHEST 2 VIEWS: CPT

## 2023-12-14 RX ORDER — ALBUTEROL SULFATE 90 UG/1
2 AEROSOL, METERED RESPIRATORY (INHALATION) 4 TIMES DAILY PRN
Qty: 18 G | Refills: 0 | Status: SHIPPED | OUTPATIENT
Start: 2023-12-14

## 2023-12-14 NOTE — TELEPHONE ENCOUNTER
Patient calls office stating that the PFT is not scheduled for 2 weeks but he feels the shortness of breath is getting worse and he is miserable. He is wanting to know if an inhaler would be recommended for him to try.

## 2023-12-15 ENCOUNTER — HOSPITAL ENCOUNTER (OUTPATIENT)
Dept: OCCUPATIONAL THERAPY | Age: 33
Setting detail: THERAPIES SERIES
Discharge: HOME | End: 2023-12-15
Payer: COMMERCIAL

## 2023-12-15 PROCEDURE — 97018 PARAFFIN BATH THERAPY: CPT

## 2023-12-15 PROCEDURE — 97140 MANUAL THERAPY 1/> REGIONS: CPT

## 2023-12-15 PROCEDURE — 97110 THERAPEUTIC EXERCISES: CPT

## 2023-12-27 ENCOUNTER — HOSPITAL ENCOUNTER (OUTPATIENT)
Dept: PULMONOLOGY | Age: 33
Discharge: HOME | End: 2023-12-27
Payer: COMMERCIAL

## 2023-12-27 ENCOUNTER — HOSPITAL ENCOUNTER (OUTPATIENT)
Dept: OCCUPATIONAL THERAPY | Age: 33
Discharge: HOME | End: 2023-12-27
Payer: COMMERCIAL

## 2023-12-27 DIAGNOSIS — R06.02 SHORTNESS OF BREATH: ICD-10-CM

## 2023-12-27 PROCEDURE — 6370000000 HC RX 637 (ALT 250 FOR IP): Performed by: INTERNAL MEDICINE

## 2023-12-27 PROCEDURE — 94060 EVALUATION OF WHEEZING: CPT

## 2023-12-27 PROCEDURE — 94726 PLETHYSMOGRAPHY LUNG VOLUMES: CPT

## 2023-12-27 PROCEDURE — 97140 MANUAL THERAPY 1/> REGIONS: CPT

## 2023-12-27 PROCEDURE — 97018 PARAFFIN BATH THERAPY: CPT

## 2023-12-27 PROCEDURE — 94664 DEMO&/EVAL PT USE INHALER: CPT

## 2023-12-27 PROCEDURE — 97110 THERAPEUTIC EXERCISES: CPT

## 2023-12-27 PROCEDURE — 94729 DIFFUSING CAPACITY: CPT

## 2023-12-27 RX ORDER — ALBUTEROL SULFATE 90 UG/1
4 AEROSOL, METERED RESPIRATORY (INHALATION) ONCE
Status: COMPLETED | OUTPATIENT
Start: 2023-12-27 | End: 2023-12-27

## 2023-12-27 RX ADMIN — ALBUTEROL SULFATE 4 PUFF: 90 AEROSOL, METERED RESPIRATORY (INHALATION) at 14:47

## 2023-12-28 ENCOUNTER — OFFICE VISIT (OUTPATIENT)
Dept: PRIMARY CARE CLINIC | Age: 33
End: 2023-12-28
Payer: COMMERCIAL

## 2023-12-28 VITALS
SYSTOLIC BLOOD PRESSURE: 122 MMHG | BODY MASS INDEX: 23.11 KG/M2 | WEIGHT: 165.1 LBS | DIASTOLIC BLOOD PRESSURE: 72 MMHG | TEMPERATURE: 97.8 F | RESPIRATION RATE: 18 BRPM | OXYGEN SATURATION: 100 % | HEART RATE: 97 BPM

## 2023-12-28 DIAGNOSIS — J45.30 MILD PERSISTENT ASTHMA WITHOUT COMPLICATION: Primary | ICD-10-CM

## 2023-12-28 DIAGNOSIS — K58.0 IRRITABLE BOWEL SYNDROME WITH DIARRHEA: ICD-10-CM

## 2023-12-28 DIAGNOSIS — J98.8: ICD-10-CM

## 2023-12-28 PROCEDURE — 99214 OFFICE O/P EST MOD 30 MIN: CPT | Performed by: NURSE PRACTITIONER

## 2023-12-28 PROCEDURE — G8484 FLU IMMUNIZE NO ADMIN: HCPCS | Performed by: NURSE PRACTITIONER

## 2023-12-28 PROCEDURE — 1036F TOBACCO NON-USER: CPT | Performed by: NURSE PRACTITIONER

## 2023-12-28 PROCEDURE — G8420 CALC BMI NORM PARAMETERS: HCPCS | Performed by: NURSE PRACTITIONER

## 2023-12-28 PROCEDURE — G8427 DOCREV CUR MEDS BY ELIG CLIN: HCPCS | Performed by: NURSE PRACTITIONER

## 2023-12-28 RX ORDER — ELUXADOLINE 100 MG/1
1 TABLET, FILM COATED ORAL 2 TIMES DAILY
Qty: 180 TABLET | Refills: 0 | Status: SHIPPED | OUTPATIENT
Start: 2023-12-28 | End: 2024-03-27

## 2023-12-28 NOTE — PROGRESS NOTES
Conjunctivae normal.   Cardiovascular:      Rate and Rhythm: Normal rate and regular rhythm. Heart sounds: No murmur heard. Pulmonary:      Effort: Pulmonary effort is normal. No respiratory distress. Breath sounds: Normal breath sounds. No wheezing. Abdominal:      General: Bowel sounds are normal. There is no distension. Palpations: Abdomen is soft. Tenderness: There is no abdominal tenderness. Musculoskeletal:         General: Normal range of motion. Cervical back: Normal range of motion and neck supple. Right lower leg: No edema. Left lower leg: No edema. Lymphadenopathy:      Cervical: No cervical adenopathy. Skin:     General: Skin is warm and dry. Findings: No rash. Neurological:      Mental Status: He is alert and oriented to person, place, and time. Psychiatric:         Mood and Affect: Mood normal.         Behavior: Behavior normal.         Data:     Lab Results   Component Value Date/Time     09/12/2023 07:42 PM    K 3.4 09/12/2023 07:42 PM     09/12/2023 07:42 PM    CO2 24 09/12/2023 07:42 PM    BUN 7 09/12/2023 07:42 PM    CREATININE 0.9 09/12/2023 07:42 PM    GLUCOSE 123 09/12/2023 07:42 PM     Lab Results   Component Value Date/Time    WBC 6.3 09/12/2023 07:42 PM    RBC 4.80 09/12/2023 07:42 PM    HGB 14.6 09/12/2023 07:42 PM    HCT 41.8 09/12/2023 07:42 PM    MCV 87.1 09/12/2023 07:42 PM    MCH 30.4 09/12/2023 07:42 PM    MCHC 34.9 09/12/2023 07:42 PM    RDW 11.7 09/12/2023 07:42 PM     09/12/2023 07:42 PM    MPV 11.9 09/12/2023 07:42 PM     No results found for: \"TSH\"  No results found for: \"CHOL\", \"LDL\", \"HDL\", \"PSA\", \"LABA1C\"    Assessment/Plan:      Diagnosis Orders   1. Mild persistent asthma without complication  ADVAIR DISKUS 250-50 MCG/ACT AEPB diskus inhaler      2. Inflammatory disorder of respiratory system  ADVAIR DISKUS 250-50 MCG/ACT AEPB diskus inhaler      3.  Irritable bowel syndrome with diarrhea  Eluxadoline

## 2023-12-28 NOTE — PATIENT INSTRUCTIONS
SURVEY:    You may be receiving a survey from [employer] regarding your visit today. Please complete the survey to enable us to provide the highest quality of care to you and your family. If you cannot score us a very good on any question, please call the office to discuss how we could have made your experience a very good one. Thank you.

## 2023-12-29 RX ORDER — FLUTICASONE PROPIONATE AND SALMETEROL 50; 250 UG/1; UG/1
1 POWDER RESPIRATORY (INHALATION) EVERY 12 HOURS
Qty: 60 EACH | Refills: 5 | Status: SHIPPED | OUTPATIENT
Start: 2023-12-29

## 2023-12-29 NOTE — PROCEDURES
[place], [place] [address]                               PULMONARY FUNCTION    PATIENT NAME: Gerardo Raines                     :        1990  MED REC NO:   [id number]                              ROOM:  ACCOUNT NO:   [de-identified]                           ADMIT DATE: 2023  PROVIDER:     Lori Overton    DATE OF PROCEDURE:  2023    INTERPRETATION:  Spirometry shows FVC is 6.01, 117% of predicted. FEV1  is 4.62, 109% of predicted. Both are within normal limits. FEV1/FVC  ratio is consistent with mild obstructive ventilatory impairment. Postbronchodilator, there is no significant response to bronchodilator  to suggest airway reversibility. Lung volumes show residual volume is 1.48, 84% of predicted. Total lung  capacity is 7.36, 102% of predicted. Both are within normal limits. Diffusion capacity is 34.03, 102% of predicted, which is within normal  limits. IMPRESSION:  This pulmonary function test is consistent with mild  obstructive ventilatory impairment because of reduced FEV1/FVC ratio. Postbronchodilator, there is no significant response to bronchodilator,  but clinical response is possible. Lung volumes are normal.  Diffusion  capacity is normal.  Clinical correlation is recommended.         Lori Overton    D: 2023 16:34:15       T: 2023 0:32:08     [id number]  Job#: [id number]     Doc#: [id number]    CC:

## 2024-01-03 ENCOUNTER — TELEPHONE (OUTPATIENT)
Dept: PRIMARY CARE CLINIC | Age: 34
End: 2024-01-03

## 2024-01-03 DIAGNOSIS — J45.30 MILD PERSISTENT ASTHMA WITHOUT COMPLICATION: ICD-10-CM

## 2024-01-03 DIAGNOSIS — J98.8: ICD-10-CM

## 2024-01-03 RX ORDER — FLUTICASONE PROPIONATE AND SALMETEROL 50; 250 UG/1; UG/1
1 POWDER RESPIRATORY (INHALATION) EVERY 12 HOURS
Qty: 60 EACH | Refills: 5 | Status: SHIPPED | OUTPATIENT
Start: 2024-01-03

## 2024-01-03 NOTE — TELEPHONE ENCOUNTER
Pharmacy called and stated that the fluticasone-salmeterol is not under the patients formulary. Alternatives that may be called in that are covered are:    Advair  Breo Ellipta  Symbicort  Please advise thank you

## 2024-01-03 NOTE — TELEPHONE ENCOUNTER
Error patient was confused regarding the medication. Contacted pharmacy and it just needed sent out and they have done what was needed to be done.

## 2024-01-04 ENCOUNTER — TELEPHONE (OUTPATIENT)
Dept: PRIMARY CARE CLINIC | Age: 34
End: 2024-01-04

## 2024-01-04 NOTE — TELEPHONE ENCOUNTER
Patient contacted the office in regards to his Advair inhaler. Patient contacted the pharmacy and they told him they would have it in later today or tomorrow. Patient doesn't want to wait any longer for the medication and was wondering if there was an alternative.     Patient was advised [place] was unavailable but Juany was in office.     Please advise.

## 2024-01-05 ENCOUNTER — HOSPITAL ENCOUNTER (OUTPATIENT)
Dept: OCCUPATIONAL THERAPY | Age: 34
Setting detail: THERAPIES SERIES
Discharge: HOME | End: 2024-01-05
Payer: COMMERCIAL

## 2024-01-05 DIAGNOSIS — R06.02 SHORTNESS OF BREATH: ICD-10-CM

## 2024-01-05 PROCEDURE — 97110 THERAPEUTIC EXERCISES: CPT

## 2024-01-05 PROCEDURE — 97140 MANUAL THERAPY 1/> REGIONS: CPT

## 2024-01-05 PROCEDURE — 97018 PARAFFIN BATH THERAPY: CPT

## 2024-01-05 RX ORDER — ALBUTEROL SULFATE 90 UG/1
2 AEROSOL, METERED RESPIRATORY (INHALATION) 4 TIMES DAILY PRN
Qty: 6.7 EACH | OUTPATIENT
Start: 2024-01-05

## 2024-01-05 NOTE — PROGRESS NOTES
[x]  Paraffin [id number] 10 1 [phone]   []  Ther Act 81746      Total Treatment time  45 min     Total Time of Time Codes  35 min           PLAN  [x]Continue with current plan of care  []Medical “Hold”  []I“Hold” per patient request  [] Change Treatment plan:  [] Insurance hold  __ Other        Electronically signed by SILVANO Phillips/L, FILIPPO  1/5/2024 2:49 PM

## 2024-01-08 ENCOUNTER — HOSPITAL ENCOUNTER (OUTPATIENT)
Dept: OCCUPATIONAL THERAPY | Age: 34
Setting detail: THERAPIES SERIES
Discharge: HOME | End: 2024-01-08
Payer: COMMERCIAL

## 2024-01-08 ENCOUNTER — TELEPHONE (OUTPATIENT)
Dept: PRIMARY CARE CLINIC | Age: 34
End: 2024-01-08

## 2024-01-08 DIAGNOSIS — J45.30 MILD PERSISTENT ASTHMA WITHOUT COMPLICATION: ICD-10-CM

## 2024-01-08 DIAGNOSIS — J98.8: ICD-10-CM

## 2024-01-08 PROCEDURE — 97018 PARAFFIN BATH THERAPY: CPT

## 2024-01-08 PROCEDURE — 97110 THERAPEUTIC EXERCISES: CPT

## 2024-01-08 PROCEDURE — 97140 MANUAL THERAPY 1/> REGIONS: CPT

## 2024-01-08 RX ORDER — FLUTICASONE PROPIONATE AND SALMETEROL 50; 250 UG/1; UG/1
1 POWDER RESPIRATORY (INHALATION) EVERY 12 HOURS
Qty: 60 EACH | Refills: 5 | Status: SHIPPED | OUTPATIENT
Start: 2024-01-08 | End: 2024-01-11 | Stop reason: ALTCHOICE

## 2024-01-08 NOTE — TELEPHONE ENCOUNTER
I have specifically ordered Advair, dispense as written.  I did not order Wixela.  Advair is listed as a covered medication.  Please call and figure out what is going on.  Thank you.

## 2024-01-08 NOTE — TELEPHONE ENCOUNTER
Patient PA for Advair came back denied.     Please advise.

## 2024-01-08 NOTE — PROGRESS NOTES
Phone: [phone]                 [facility]    Fax: [phone]                       Outpatient Occupational Therapy                 DAILY TREATMENT NOTE    Date: 1/8/2024  Patient’s Name:  Kofi Roland  YOB: 1990 (33 y.o.)  Gender:  male  MRN:  [id number]  [facility] #: [id number]  Medical Diagnosis: S63.642 -  Sprain of metacarpophalangeal joint of left thumb    Referring Physician: No ref. provider found     INSURANCE  OT Insurance Information: [facility]   Total # of Visits Approved: 36   Total # of Visits to Date: 20       PAIN  [x]No     []Yes      Location:   Pain Rating (0-10 pain scale):   Pain Description:     SUBJECTIVE   Patient with no new complaints.                 Flow Sheet   Exercise /   Manual treatment Weight/  Level Reps/Time Comments    Joint mobilizations and distractions x x         L wrist and thumb to improve ROM    PROM c progressive stretch x x L wrist and thumb to improve functional use and ROM      IASTM x  x  L thumb MP area both dorsal and volar for STM    Cupping  x x  L thumb MP area both dorsal and volar for STM     Thumb ROM     Pegboard - small and x-small pegs to improve thumb ROM   Power web  Green X20 MP flexion and extension to improve strength *   Egg  Green x20 Duckbill,  and pinches to improve stretch     HandMaster   Digit flexion and extension toimprove strength    AAROM     Thumb MP and IP composite flexion     Putty     L hand pinches x 3     Place and hold     Thumb composite flexion to improve ROM    Flexbar  Green X20 Twists and bends to improve strength   Squigz      Suction to table and removal to improve strength    K-tape     Issued and instructed on application - patient to apply after work    digiflex         Thumb opposition     To increase L thumb ROM     Bubble wrap          TherActs   In hand manipulation with small pegs - min difficulty                                        Modality Flow Sheet:   START STOP Tx Modality

## 2024-01-09 ENCOUNTER — TELEPHONE (OUTPATIENT)
Dept: PRIMARY CARE CLINIC | Age: 34
End: 2024-01-09

## 2024-01-09 DIAGNOSIS — J45.30 MILD PERSISTENT ASTHMA WITHOUT COMPLICATION: ICD-10-CM

## 2024-01-09 DIAGNOSIS — R13.19 ESOPHAGEAL DYSPHAGIA: ICD-10-CM

## 2024-01-09 DIAGNOSIS — J98.8: Primary | ICD-10-CM

## 2024-01-09 RX ORDER — PREDNISONE 5 MG/1
TABLET ORAL
Qty: 45 TABLET | Refills: 0 | Status: SHIPPED | OUTPATIENT
Start: 2024-01-09 | End: 2024-02-08

## 2024-01-09 NOTE — TELEPHONE ENCOUNTER
Patient contacted the office and stated that the Advair that he has been using since Friday is not working. He is still having chest tightness, lump in the back of the throat, and states feels like he is \"breathing through a straw\".    Please advise thank you

## 2024-01-09 NOTE — TELEPHONE ENCOUNTER
I would like him to start low-dose prednisone, 10 mg daily for 15 days and then decrease to 5 mg daily.  Continue Advair.    I put referrals into pulmonology and gastroenterology.  I would like him to be seen ASAP if possible.    I would like him to call in a few days after starting the prednisone with progress.  Thank you.

## 2024-01-10 ENCOUNTER — TELEPHONE (OUTPATIENT)
Dept: GASTROENTEROLOGY | Age: 34
End: 2024-01-10

## 2024-01-10 ENCOUNTER — HOSPITAL ENCOUNTER (OUTPATIENT)
Dept: OCCUPATIONAL THERAPY | Age: 34
Setting detail: THERAPIES SERIES
Discharge: HOME | End: 2024-01-10
Payer: COMMERCIAL

## 2024-01-10 PROCEDURE — 97140 MANUAL THERAPY 1/> REGIONS: CPT

## 2024-01-10 PROCEDURE — 97018 PARAFFIN BATH THERAPY: CPT

## 2024-01-10 PROCEDURE — 97110 THERAPEUTIC EXERCISES: CPT

## 2024-01-10 NOTE — PROGRESS NOTES
Treatment plan:  [] Insurance hold  __ Other        Electronically signed by SILVANO Phillips/L, T  1/10/2024 2:05 PM

## 2024-01-11 ENCOUNTER — OFFICE VISIT (OUTPATIENT)
Dept: PULMONOLOGY | Age: 34
End: 2024-01-11
Payer: COMMERCIAL

## 2024-01-11 VITALS
BODY MASS INDEX: 22.86 KG/M2 | DIASTOLIC BLOOD PRESSURE: 74 MMHG | OXYGEN SATURATION: 99 % | HEIGHT: 71 IN | RESPIRATION RATE: 16 BRPM | TEMPERATURE: 97.6 F | SYSTOLIC BLOOD PRESSURE: 117 MMHG | WEIGHT: 163.3 LBS | HEART RATE: 71 BPM

## 2024-01-11 DIAGNOSIS — J45.40 MODERATE PERSISTENT ASTHMA WITHOUT COMPLICATION: Primary | ICD-10-CM

## 2024-01-11 PROCEDURE — 99204 OFFICE O/P NEW MOD 45 MIN: CPT | Performed by: INTERNAL MEDICINE

## 2024-01-11 RX ORDER — BUDESONIDE AND FORMOTEROL FUMARATE DIHYDRATE 160; 4.5 UG/1; UG/1
2 AEROSOL RESPIRATORY (INHALATION) 2 TIMES DAILY
Qty: 30.6 G | Refills: 1 | Status: SHIPPED | OUTPATIENT
Start: 2024-01-11 | End: 2024-01-11 | Stop reason: ALTCHOICE

## 2024-01-11 RX ORDER — ALBUTEROL SULFATE 90 UG/1
2 AEROSOL, METERED RESPIRATORY (INHALATION) EVERY 6 HOURS PRN
Qty: 18 G | Refills: 3 | Status: SHIPPED | OUTPATIENT
Start: 2024-01-11

## 2024-01-11 NOTE — PROGRESS NOTES
months since last LDCT      SOCIAL HISTORY/SOCIAL DETERMINANTS OF HEALTH:  Social Determinants of Health with Concerns     Tobacco Use: Medium Risk (1/11/2024)    Patient History     Smoking Tobacco Use: Former     Smokeless Tobacco Use: Never     Passive Exposure: Never   Financial Resource Strain: Patient Declined (12/20/2023)    Overall Financial Resource Strain (CARDIA)     Difficulty of Paying Living Expenses: Patient declined   Food Insecurity: Patient Declined (12/20/2023)    Hunger Vital Sign     Worried About Running Out of Food in the Last Year: Patient declined     Ran Out of Food in the Last Year: Patient declined   Transportation Needs: Unknown (12/20/2023)    PRAPARE - Transportation     Lack of Transportation (Medical): No     Lack of Transportation (Non-Medical): Patient declined   Housing Stability: Unknown (12/20/2023)    Housing Stability Vital Sign     Unable to Pay for Housing in the Last Year: Not on file     Number of Places Lived in the Last Year: Not on file     Unstable Housing in the Last Year: Patient declined   Utilities: Not on file       SMOKING CESSATION RECOMMENDATIONS/COUNSELING:  Patient to continue smoking cessation.    LIFESTYLE MODIFICATION RECOMMENDATIONS/COUNSELING:  Follow healthy behaviors: nutrition, exercise and medication adherence.  Reviewed weight control and importance of maintaining an active lifestyle.    IMMUNIZATION HISTORY/RECOMMENDATIONS:    There is no immunization history on file for this patient.  Recommend influenza vaccination in the fall annually.  Recommendations were given regarding pneumococcal and COVID-19 vaccination.    DECISION REGARDING PROCEDURE/SURGERY/HOSPITALIZATION:    All the questions that the patient had were answered to his satisfaction.  We'll see the patient back in three months.  Thank you for having us involved in the care of your patient.  Please call us if you have any questions or concerns.    Jus Terry MD  Pulmonary and Critical

## 2024-01-11 NOTE — PATIENT INSTRUCTIONS
SURVEY:    You may be receiving a survey from [employer] regarding your visit today.    Please complete the survey to enable us to provide the highest quality of care to you and your family.    If you cannot score us a very good on any question, please call the office to discuss how we could have made your experience a very good one.    Thank you.

## 2024-01-12 ENCOUNTER — HOSPITAL ENCOUNTER (OUTPATIENT)
Dept: OCCUPATIONAL THERAPY | Age: 34
Discharge: HOME | End: 2024-01-12
Payer: COMMERCIAL

## 2024-01-12 PROCEDURE — 97018 PARAFFIN BATH THERAPY: CPT

## 2024-01-12 PROCEDURE — 97140 MANUAL THERAPY 1/> REGIONS: CPT

## 2024-01-12 PROCEDURE — 97110 THERAPEUTIC EXERCISES: CPT

## 2024-01-12 NOTE — PROGRESS NOTES
Phone: [phone]                 [facility]    Fax: [phone]                       Outpatient Occupational Therapy                 DAILY TREATMENT NOTE    Date: 1/12/2024  Patient’s Name:  Kofi Roland  YOB: 1990 (33 y.o.)  Gender:  male  MRN:  [id number]  [facility] #: [id number]  Medical Diagnosis: S63.642 -  Sprain of metacarpophalangeal joint of left thumb    Referring Physician: No ref. provider found     INSURANCE  OT Insurance Information: [facility]   Total # of Visits Approved: 36   Total # of Visits to Date: 22       PAIN  [x]No     []Yes      Location:   Pain Rating (0-10 pain scale):   Pain Description:     SUBJECTIVE                  Flow Sheet   Exercise /   Manual treatment Weight/  Level Reps/Time Comments    Joint mobilizations and distractions x x         L wrist and thumb to improve ROM    PROM c progressive stretch x x L wrist and thumb to improve functional use and ROM      IASTM x  x  L thumb MP area both dorsal and volar for STM    Cupping   L thumb MP area both dorsal and volar for STM     Thumb ROM     Pegboard - small and x-small pegs to improve thumb ROM   Power web  Green x20 MP flexion and extension to improve strength *   Egg  Green x20 Duckbill,  and pinches to improve stretch     HandMaster     Digit flexion and extension toimprove strength    AAROM     Thumb MP and IP composite flexion     Putty     L hand pinches x 3     Place and hold     Thumb composite flexion to improve ROM    Flexbar  Green X20 Twists and bends to improve strength   Squigz      Suction to table and removal to improve strength    K-tape     Issued and instructed on application - patient to apply after work    digiflex         Thumb opposition     To increase L thumb ROM     Bubble wrap          TherActs     In hand manipulation with small pegs - min difficulty    Finger popper x x20 2 pin pinch and thumb ext to improve strength                              Modality Flow Sheet:   START STOP

## 2024-01-15 ENCOUNTER — HOSPITAL ENCOUNTER (OUTPATIENT)
Dept: OCCUPATIONAL THERAPY | Age: 34
Discharge: HOME | End: 2024-01-15
Payer: COMMERCIAL

## 2024-01-15 PROCEDURE — 97110 THERAPEUTIC EXERCISES: CPT

## 2024-01-15 PROCEDURE — 97018 PARAFFIN BATH THERAPY: CPT

## 2024-01-15 PROCEDURE — 97140 MANUAL THERAPY 1/> REGIONS: CPT

## 2024-01-15 NOTE — PROGRESS NOTES
met  []Not met   ADDITIONAL COMMENTS            EDUCATION  New Education provided to patient/family/caregiver:    []Yes:     [x]No (Continued review of prior education)   If yes Education Provided:     Method of Education:     [x]Discussion     []Demonstration    [] Written     []Other  Evaluation of Patient’s Response to Education:         [x]Patient and or caregiver verbalized understanding  []Patient and or Caregiver Demonstrated without assistance   []Patient and or Caregiver Demonstrated with assistance  []Needs additional instruction to demonstrate understanding of education    ASSESSMENT  Patient tolerated today’s treatment session:    [x] Good   []  Fair   []  Poor  Limitations/difficulties with treatment session due to:   []Pain     []Fatigue     []Other medical complications     []Other  Goal Assessment: [] No Change    [x]Improved        HEP  Patient is to be completing/ issued the following handouts/exercises since SOC:   HEP Weight/  Level Reps/Time Comments    Putty Yellow 2x/day for 10 min sessions If painful, patient instructed to stop putty   Wrist ROM x 2x/day, 2 sets of 10 reps with 3-5 second hold Focus mostly on UD and RD    Thumb ROM x 2x/day, 2 sets of 10 reps with 3-5 second hold All thumb motions                                         Minutes Tracking:  Time In: 1002  Time Out: 1048  Minutes: 46  Timed Code Treatment Minutes: 44 Minutes    Treatment Charges: Code Total Mins Units Time In/Out   [] Evaluation       []  Low       []  Moderate       []  High  [] Re-Evaluation   98651  72724  85313  97121      [x]  Ther Exercise 34729 17 1 [id number]   [x]  Manual Therapy 94778  17  1 8400-8377   []  Unattend Estim 56575      []  Ultrasound 43188      []  Iontophoresis 31222      []  Neuro Devi 47236      []  Fluidotherapy 93465      [x]  Paraffin 90849 10 1 1601-2052   []  Ther Act 94267      Total Treatment time  44 min     Total Time of Time Codes  34 min           PLAN  [x]Continue with

## 2024-01-17 ENCOUNTER — HOSPITAL ENCOUNTER (OUTPATIENT)
Dept: OCCUPATIONAL THERAPY | Age: 34
Discharge: HOME | End: 2024-01-17
Payer: COMMERCIAL

## 2024-01-17 PROCEDURE — 97140 MANUAL THERAPY 1/> REGIONS: CPT

## 2024-01-17 PROCEDURE — 97110 THERAPEUTIC EXERCISES: CPT

## 2024-01-17 PROCEDURE — 97018 PARAFFIN BATH THERAPY: CPT

## 2024-01-17 NOTE — PROGRESS NOTES
Phone: [phone]                 [facility]    Fax: [phone]                       Outpatient Occupational Therapy                 DAILY TREATMENT NOTE    Date: 1/17/2024  Patient’s Name:  Kofi Roland  YOB: 1990 (33 y.o.)  Gender:  male  MRN:  [id number]  [facility] #: [id number]  Medical Diagnosis: S63.642 -  Sprain of metacarpophalangeal joint of left thumb    Referring Physician: Juan J Mijares MD     INSURANCE  OT Insurance Information: [facility]   Total # of Visits Approved: 36   Total # of Visits to Date: 24       PAIN  []No     []Yes      Location:   Pain Rating (0-10 pain scale):   Pain Description:     SUBJECTIVE   Patient states that he has been using his L hand way more during I/ADL.                Flow Sheet   Exercise /   Manual treatment Weight/  Level Reps/Time Comments    Joint mobilizations and distractions x x         L wrist and thumb to improve ROM    PROM c progressive stretch x x L wrist and thumb to improve functional use and ROM      IASTM  x x  L thumb MP area both dorsal and volar for STM    Cupping  x x  L thumb MP area both dorsal and volar for STM     Thumb ROM     Pegboard - small and x-small pegs to improve thumb ROM   Power web  Green x20 MP flexion and extension to improve strength *   Egg  Green x20 Duckbill,  and pinches to improve stretch     HandMaster Red ball, black band  x20 Digit flexion and extension toimprove strength    AAROM     Thumb MP and IP composite flexion     Putty     L hand pinches x 3     Place and hold     Thumb composite flexion to improve ROM    Flexbar  Green X20 Twists and bends to improve strength   Squigz      Suction to table and removal to improve strength    K-tape     Issued and instructed on application - patient to apply after work    digiflex green  x20   and pinches to improve strength   Thumb opposition     To increase L thumb ROM     Bubble wrap          TherActs     In hand manipulation with small pegs - min

## 2024-01-22 ENCOUNTER — HOSPITAL ENCOUNTER (OUTPATIENT)
Dept: OCCUPATIONAL THERAPY | Age: 34
Discharge: HOME | End: 2024-01-22
Payer: COMMERCIAL

## 2024-01-22 PROCEDURE — 97018 PARAFFIN BATH THERAPY: CPT

## 2024-01-22 PROCEDURE — 97140 MANUAL THERAPY 1/> REGIONS: CPT

## 2024-01-22 PROCEDURE — 97110 THERAPEUTIC EXERCISES: CPT

## 2024-01-22 NOTE — PROGRESS NOTES
Occupational Therapy  Phone: [phone]                 [facility]    Fax: [phone]                       Outpatient Occupational Therapy                 DAILY TREATMENT NOTE    Date: 1/22/2024  Patient’s Name:  Kofi Roland  YOB: 1990 (33 y.o.)  Gender:  male  MRN:  [id number]  [facility] #: [id number]  Medical Diagnosis: S63.642 -  Sprain of metacarpophalangeal joint of left thumb    Referring Physician: No ref. provider found     INSURANCE  OT Insurance Information: [facility]   Total # of Visits Approved: 36   Total # of Visits to Date: 25       PAIN  [x]No     []Yes      Location:   Pain Rating (0-10 pain scale):   Pain Description:     SUBJECTIVE   Patient with no new complaints.               Flow Sheet   Exercise /   Manual treatment Weight/  Level Reps/Time Comments    Joint mobilizations and distractions x x         L wrist and thumb to improve ROM    PROM c progressive stretch x x L wrist and thumb to improve functional use and ROM      IASTM  x x  L thumb MP area both dorsal and volar for STM    Cupping    L thumb MP area both dorsal and volar for STM     Thumb ROM     Pegboard - small and x-small pegs to improve thumb ROM   Power web  Green x20 MP flexion and extension to improve strength *   Egg  Green x20 Duckbill,  and pinches to improve stretch     HandMaster Red ball, black band  x20 Digit flexion and extension toimprove strength    AAROM     Thumb MP and IP composite flexion     Putty     L hand pinches x 3     Place and hold     Thumb composite flexion to improve ROM    Flexbar  Green X20 Twists and bends to improve strength   Squigz      Suction to table and removal to improve strength    K-tape     Issued and instructed on application - patient to apply after work    digiflex green  x20   and pinches to improve strength   Thumb opposition     To increase L thumb ROM     Bubble wrap          TherActs  x x In hand manipulation with small pegs - min difficulty    Finger

## 2024-01-24 ENCOUNTER — HOSPITAL ENCOUNTER (OUTPATIENT)
Dept: OCCUPATIONAL THERAPY | Age: 34
Discharge: HOME | End: 2024-01-24
Payer: COMMERCIAL

## 2024-01-24 PROCEDURE — 97018 PARAFFIN BATH THERAPY: CPT

## 2024-01-24 PROCEDURE — 97110 THERAPEUTIC EXERCISES: CPT

## 2024-01-24 PROCEDURE — 97140 MANUAL THERAPY 1/> REGIONS: CPT

## 2024-01-24 NOTE — PROGRESS NOTES
Phone: [phone]                 [facility]    Fax: [phone]                       Outpatient Occupational Therapy                 DAILY TREATMENT NOTE    Date: 1/24/2024  Patient’s Name:  Kofi Roland  YOB: 1990 (33 y.o.)  Gender:  male  MRN:  [id number]  [facility] #: [id number]  Medical Diagnosis: S63.642 -  Sprain of metacarpophalangeal joint of left thumb    Referring Physician: No ref. provider found     INSURANCE  OT Insurance Information: [facility]   Total # of Visits Approved: 36   Total # of Visits to Date: 26       PAIN  [x]No     []Yes      Location:   Pain Rating (0-10 pain scale):   Pain Description:     SUBJECTIVE   Patient with no new complaints.                Flow Sheet   Exercise /   Manual treatment Weight/  Level Reps/Time Comments    Joint mobilizations and distractions x x         L wrist and thumb to improve ROM    PROM c progressive stretch x x L wrist and thumb to improve functional use and ROM      IASTM   L thumb MP area both dorsal and volar for STM    Cupping     L thumb MP area both dorsal and volar for STM     Thumb ROM     Pegboard - small and x-small pegs to improve thumb ROM   Power web  Green x20 MP flexion and extension to improve strength *   Egg  Green x20 Duckbill,  and pinches to improve stretch     HandMaster Red ball, black band  x20 Digit flexion and extension toimprove strength    AAROM     Thumb MP and IP composite flexion     Putty     L hand pinches x 3     Place and hold     Thumb composite flexion to improve ROM    Flexbar  Green X20 Twists and bends to improve strength   Squigz      Suction to table and removal to improve strength   Hand gripper Green x20  to improve strength    digiflex green  x20   and pinches to improve strength   [place] Blue 10x with 5-10 second hold Isometric thumb ext to improve strength    Bubble wrap          TherActs  x x In hand manipulation with x-small pegs - mod difficulty    Finger popper     2 pin pinch

## 2024-01-26 ENCOUNTER — HOSPITAL ENCOUNTER (OUTPATIENT)
Dept: OCCUPATIONAL THERAPY | Age: 34
Discharge: HOME | End: 2024-01-26
Payer: COMMERCIAL

## 2024-01-26 PROCEDURE — 97018 PARAFFIN BATH THERAPY: CPT

## 2024-01-26 PROCEDURE — 97110 THERAPEUTIC EXERCISES: CPT

## 2024-01-26 PROCEDURE — 97140 MANUAL THERAPY 1/> REGIONS: CPT

## 2024-01-26 NOTE — PROGRESS NOTES
pinch and thumb ext to improve strength                              Modality Flow Sheet:   START STOP Tx Modality       Electrical Stim:          Ultrasound: __1.2_ W/cm2 x _8__ mins  Duty factor: _x_100%  __50%  __20% __10%  Head size:   MHz: __1mHz __2 mHz  _x_3mHz  Location:L Thumb MP joint for stiffness. Patient tolerated well c skin intact pre and post treatment.      10 minutes Hot Pack: L hand for stiffness. Patient tolerated well c skin intact pre and post treatment. Combined c paraffin.      10 minutes Paraffin: L hand for stiffness. Patient tolerated well c skin intact pre and post treatment.Combined c HP.        Cold Pack:       Dry Needling: __1_\" needle to MP joint of L thumb for stiffness. Patient tolerated well c skin intact pre and post treatment.  _x__No manipulation/static  ___Basic Manipulation  ___Pistoning Manipulation  ___Needle Rotation  ___Tenting   Consents obtained.                   GOALS   Time Frame for Long Term Goals : 6 weeks       Long Term Goal 1: Patient to state <10% impairment throughout daily tasks, as measured by the DASH. Continue []Met  [x]Partially met  []Not met   Long Term Goal 2: Patient to improve L tumb MP to 40 and IP to 60 to improve functional use of hand throughout ADL. Continue    []Met  [x]Partially met  []Not met   Long Term Goal 3: Patient to improve 2 pt to 15#, 3 pt to 17# and lateral to 19# with min c/o pain to improve hand strength s/p injury. Continue  2 pt: 15 - MET  3 pt: 19 - MET  Lat: 17 []Met  [x]Partially met  []Not met   Time Frame for Short Term Goals: 4 weeks       Short Term Goal 1: Patient to be educated on HEP to improve L hand/wrist function and strength. Patient educated on initial HEP with light putty and ROM to wrist and thumb. Continue to update as patient progresses.  [x]Met  []Partially met  []Not met   Short Term Goal 2: Patient to improve thumb MP flexion to 35* to improve ROM for functional use.  Continue   []Met  [x]Partially

## 2024-01-29 ENCOUNTER — HOSPITAL ENCOUNTER (OUTPATIENT)
Dept: OCCUPATIONAL THERAPY | Age: 34
Discharge: HOME | End: 2024-01-29
Payer: COMMERCIAL

## 2024-01-29 PROCEDURE — 97140 MANUAL THERAPY 1/> REGIONS: CPT

## 2024-01-29 PROCEDURE — 97110 THERAPEUTIC EXERCISES: CPT

## 2024-01-29 PROCEDURE — 97018 PARAFFIN BATH THERAPY: CPT

## 2024-01-29 NOTE — PROGRESS NOTES
Occupational Therapy  Phone: [phone]                 [facility]    Fax: [phone]                       Outpatient Occupational Therapy                 DAILY TREATMENT NOTE    Date: 1/29/2024  Patient’s Name:  Kofi Roland  YOB: 1990 (33 y.o.)  Gender:  male  MRN:  [id number]  [facility] #: [id number]  Medical Diagnosis: S63.642 -  Sprain of metacarpophalangeal joint of left thumb    Referring Physician: No ref. provider found     INSURANCE  OT Insurance Information: [facility]   Total # of Visits Approved: 36   Total # of Visits to Date: 28       PAIN  [x]No     []Yes      Location:   Pain Rating (0-10 pain scale):   Pain Description:     SUBJECTIVE   Pt with no new complaints                Flow Sheet   Exercise /   Manual treatment Weight/  Level Reps/Time Comments    Joint mobilizations and distractions x x         L wrist and thumb to improve ROM    PROM c progressive stretch x x L wrist and thumb to improve functional use and ROM      IASTM x   x L thumb MP area both dorsal and volar for STM    Cupping     L thumb MP area both dorsal and volar for STM     Thumb ROM     Pegboard - small and x-small pegs to improve thumb ROM   Power web  Green x20 MP flexion and extension to improve strength *   Egg  Green x20 Duckbill,  and pinches to improve stretch     HandMaster Red ball, black band  x20 Digit flexion and extension toimprove strength    AAROM     Thumb MP and IP composite flexion     Putty     L hand pinches x 3     Place and hold     Thumb composite flexion to improve ROM    Flexbar  Green X20 Twists and bends to improve strength   Squigz   x  x4 Suction to table and removal to improve strength   Hand gripper Green x20  to improve strength    digiflex     and pinches to improve strength   [place] Blue 10x with 5-10 second hold Isometric thumb ext to improve strength    Bubble wrap          TherActs    In hand manipulation with stringing small beads- min difficulty    clothes

## 2024-01-31 ENCOUNTER — HOSPITAL ENCOUNTER (OUTPATIENT)
Dept: OCCUPATIONAL THERAPY | Age: 34
Discharge: HOME | End: 2024-01-31

## 2024-01-31 NOTE — PROGRESS NOTES
Occupational Therapy  [facility]  Inpatient/Observation/Outpatient Rehabilitation    Date: 2024  Patient Name: Kofi Roland       [] Inpatient Acute/Observation       [x]  Outpatient  : 1990   Plan of Care/Recert ends    [x] Pt no showed for scheduled appointment    [] Pt refused/declined therapy at this time due to:           [] Pt cancelled due to:  [] No Reason Given   [] Sick/ill   [] Other:    [] Evaluation held by RN/Provider due to:    [] High Heart Rate   [] High Blood Pressure   [] Orthopedic Consult   [] Hgb < 7   [] Other:      MATY Jiang Date: 2024

## 2024-02-02 ENCOUNTER — HOSPITAL ENCOUNTER (OUTPATIENT)
Dept: OCCUPATIONAL THERAPY | Age: 34
Discharge: HOME | End: 2024-02-02
Payer: COMMERCIAL

## 2024-02-02 PROCEDURE — 97140 MANUAL THERAPY 1/> REGIONS: CPT

## 2024-02-02 PROCEDURE — 97110 THERAPEUTIC EXERCISES: CPT

## 2024-02-02 PROCEDURE — 97018 PARAFFIN BATH THERAPY: CPT

## 2024-02-02 NOTE — PROGRESS NOTES
beads- min difficulty    clothes pins Green, black, blue   x Place clothes pins to improve strength    grooved pegboard   x To improve fine motor skills of R hand                    Modality Flow Sheet:   START STOP Tx Modality       Electrical Stim:          Ultrasound: __1.2_ W/cm2 x _8__ mins  Duty factor: _x_100%  __50%  __20% __10%  Head size:   MHz: __1mHz __2 mHz  _x_3mHz  Location:L Thumb MP joint for stiffness. Patient tolerated well c skin intact pre and post treatment.      10 minutes Hot Pack: L hand for stiffness. Patient tolerated well c skin intact pre and post treatment. Combined c paraffin.      10 minutes Paraffin: L hand for stiffness. Patient tolerated well c skin intact pre and post treatment.Combined c HP.        Cold Pack:       Dry Needling: __1_\" needle to MP joint of L thumb for stiffness. Patient tolerated well c skin intact pre and post treatment.  _x__No manipulation/static  ___Basic Manipulation  ___Pistoning Manipulation  ___Needle Rotation  ___Tenting   Consents obtained.                   GOALS   Time Frame for Long Term Goals : 6 weeks       Long Term Goal 1: Patient to state <10% impairment throughout daily tasks, as measured by the DASH. Continue []Met  [x]Partially met  []Not met   Long Term Goal 2: Patient to improve L tumb MP to 40 and IP to 60 to improve functional use of hand throughout ADL. Continue    []Met  [x]Partially met  []Not met   Long Term Goal 3: Patient to improve 2 pt to 15#, 3 pt to 17# and lateral to 19# with min c/o pain to improve hand strength s/p injury. Continue  2 pt:  MET  3 pt:  MET   []Met  [x]Partially met  []Not met   Time Frame for Short Term Goals: 4 weeks       Short Term Goal 1: Patient to be educated on HEP to improve L hand/wrist function and strength. Patient educated on initial HEP with light putty and ROM to wrist and thumb. Continue to update as patient progresses.  [x]Met  []Partially met  []Not met   Short Term Goal 2: Patient to improve

## 2024-02-05 ENCOUNTER — HOSPITAL ENCOUNTER (OUTPATIENT)
Dept: OCCUPATIONAL THERAPY | Age: 34
Discharge: HOME | End: 2024-02-05
Payer: COMMERCIAL

## 2024-02-05 PROCEDURE — 97140 MANUAL THERAPY 1/> REGIONS: CPT

## 2024-02-05 PROCEDURE — 97018 PARAFFIN BATH THERAPY: CPT

## 2024-02-05 PROCEDURE — 97110 THERAPEUTIC EXERCISES: CPT

## 2024-02-05 NOTE — PROGRESS NOTES
Phone: [phone]                 [facility]    Fax: [phone]                       Outpatient Occupational Therapy                 DAILY TREATMENT NOTE    Date: 2/5/2024  Patient’s Name:  Kofi Roland  YOB: 1990 (33 y.o.)  Gender:  male  MRN:  [id number]  [facility] #: [id number]  Medical Diagnosis: S63.642 -  Sprain of metacarpophalangeal joint of left thumb    Referring Physician: No ref. provider found     INSURANCE  OT Insurance Information: [facility]   Total # of Visits Approved: 36   Total # of Visits to Date: 30       PAIN  [x]No     []Yes      Location:   Pain Rating (0-10 pain scale):   Pain Description:     SUBJECTIVE   Patient with no new complaints. Did state pain s/p stretching.                 Flow Sheet   Exercise /   Manual treatment Weight/  Level Reps/Time Comments    Joint mobilizations and distractions x x         L wrist and thumb to improve ROM    PROM c progressive stretch x x L wrist and thumb to improve functional use and ROM      IASTM   L thumb MP area both dorsal and volar for STM    Cupping     L thumb MP area both dorsal and volar for STM     Thumb ROM     Pegboard - small and x-small pegs to improve thumb ROM   Power web  Green x20 MP flexion and extension to improve strength *   Egg  Green x20 Duckbill,  and pinches to improve stretch     HandMaster Red ball, black band  x20 Digit flexion and extension toimprove strength    AAROM     Thumb MP and IP composite flexion     Putty     L hand pinches x 3     Place and hold     Thumb composite flexion to improve ROM    Flexbar  Green X20 Twists and bends to improve strengthd   Squigz      Suction to table and removal to improve strength   Hand gripper    to improve strength    digiflex      and pinches to improve strength   [place]     Isometric thumb ext to improve strength    Bubble wrap          TherActs     In hand manipulation with stringing small beads- min difficulty    clothes pins   Place clothes pins

## 2024-02-07 ENCOUNTER — HOSPITAL ENCOUNTER (OUTPATIENT)
Dept: OCCUPATIONAL THERAPY | Age: 34
Discharge: HOME | End: 2024-02-07
Payer: COMMERCIAL

## 2024-02-07 PROCEDURE — 97035 APP MDLTY 1+ULTRASOUND EA 15: CPT

## 2024-02-07 PROCEDURE — 97018 PARAFFIN BATH THERAPY: CPT

## 2024-02-07 PROCEDURE — 97140 MANUAL THERAPY 1/> REGIONS: CPT

## 2024-02-07 NOTE — PROGRESS NOTES
Codes  31 min           PLAN  [x]Continue with current plan of care  []Medical “Hold”  []I“Hold” per patient request  [] Change Treatment plan:  [] Insurance hold  __ Other        Electronically signed by SILVANO Phillips/L, T   2/7/2024 9:47 AM

## 2024-02-09 ENCOUNTER — HOSPITAL ENCOUNTER (OUTPATIENT)
Dept: OCCUPATIONAL THERAPY | Age: 34
Discharge: HOME | End: 2024-02-09
Payer: COMMERCIAL

## 2024-02-09 PROCEDURE — 97140 MANUAL THERAPY 1/> REGIONS: CPT

## 2024-02-09 PROCEDURE — 97018 PARAFFIN BATH THERAPY: CPT

## 2024-02-09 PROCEDURE — 97110 THERAPEUTIC EXERCISES: CPT

## 2024-02-09 NOTE — PLAN OF CARE
extension. Patient would benefit from continued therapy to address to improve functional use.      MD Comments:         Electronically signed by SILVANO Phillips/L CHT on 2/9/2024 at 1:33 PM    Regulatory Requirements  I have reviewed this plan of care and certify a need for medically necessary rehabilitation services.    Physician Signature:_____________________________________    Date:_________________________________  Please sign and fax to [phone]

## 2024-02-09 NOTE — PROGRESS NOTES
fine motor skills of R hand                    Modality Flow Sheet:   START STOP Tx Modality       Electrical Stim:         6 minutes Ultrasound: __.8_ W/cm2 x _6__ mins  Duty factor: _x_100%  __50%  __20% __10%  Head size:   MHz: __1mHz __2 mHz  _x_3mHz  Location:L Thumb MP joint for stiffness. Patient tolerated well c skin intact pre and post treatment.      10 minutes Hot Pack: L hand for stiffness. Patient tolerated well c skin intact pre and post treatment. Combined c paraffin.      10 minutes Paraffin: L hand for stiffness. Patient tolerated well c skin intact pre and post treatment.Combined c HP.        Cold Pack:       Dry Needling: __1_\" needle to MP joint of L thumb for stiffness. Patient tolerated well c skin intact pre and post treatment.  _x__No manipulation/static  ___Basic Manipulation  ___Pistoning Manipulation  ___Needle Rotation  ___Tenting   Consents obtained.                   GOALS   Time Frame for Long Term Goals : 6 weeks       Long Term Goal 1: Patient to state <10% impairment throughout daily tasks, as measured by the DASH. Continue []Met  [x]Partially met  []Not met   Long Term Goal 2: Patient to improve L tumb MP to 40 and IP to 60 to improve functional use of hand throughout ADL. Continue   MP: 30   IP: 49 []Met  [x]Partially met  []Not met   Long Term Goal 3: Patient to improve 2 pt to 15#, 3 pt to 17# and lateral to 19# with min c/o pain to improve hand strength s/p injury. Met  2 pt:  17  3 pt:  20  Lat: 20   [x]Met  []Partially met  []Not met   Time Frame for Short Term Goals: 4 weeks       Short Term Goal 1: Patient to be educated on HEP to improve L hand/wrist function and strength. Patient educated on initial HEP with light putty and ROM to wrist and thumb. Continue to update as patient progresses.  [x]Met  []Partially met  []Not met   Short Term Goal 2: Patient to improve thumb MP flexion to 35* to improve ROM for functional use.  Continue  MP: 30 []Met  [x]Partially met  []Not

## 2024-02-12 ENCOUNTER — HOSPITAL ENCOUNTER (OUTPATIENT)
Dept: OCCUPATIONAL THERAPY | Age: 34
Discharge: HOME | End: 2024-02-12
Payer: COMMERCIAL

## 2024-02-12 PROCEDURE — 97140 MANUAL THERAPY 1/> REGIONS: CPT

## 2024-02-12 PROCEDURE — 97530 THERAPEUTIC ACTIVITIES: CPT

## 2024-02-12 PROCEDURE — 97018 PARAFFIN BATH THERAPY: CPT

## 2024-02-12 NOTE — PROGRESS NOTES
Occupational Therapy  Phone: [phone]                 [facility]    Fax: [phone]                       Outpatient Occupational Therapy                 DAILY TREATMENT NOTE    Date: 2/12/2024  Patient’s Name:  Kofi Roland  YOB: 1990 (34 y.o.)  Gender:  male  MRN:  [id number]  [facility] #: [id number]  Medical Diagnosis: S63.642 -  Sprain of metacarpophalangeal joint of left thumb    Referring Physician: No ref. provider found     INSURANCE  OT Insurance Information: [facility]   Total # of Visits Approved: 36   Total # of Visits to Date: 33       PAIN  []No     [x]Yes      Location: L thumb   Pain Rating (0-10 pain scale): 1/10  Pain Description:     SUBJECTIVE   Pt stated that pain was better today      Flow Sheet   Exercise /   Manual treatment Weight/  Level Reps/Time Comments    Joint mobilizations and distractions x x         L wrist and thumb to improve ROM    PROM c progressive stretch x x L wrist and thumb to improve functional use and ROM      IASTM     L thumb MP area both dorsal and volar for STM    Cupping     L thumb MP area both dorsal and volar for STM     Thumb ROM     Pegboard - small and x-small pegs to improve thumb ROM   Power web  Green x20 MP flexion and extension to improve strength *   Egg  Green x20 Duckbill,  and pinches to improve stretch     HandMaster Red ball, black band  x20 Digit flexion and extension toimprove strength    AAROM     Thumb MP and IP composite flexion     Putty     L hand pinches x 3     Place and hold     Thumb composite flexion to improve ROM    Flexbar  Green X20 Twists and bends to improve strengthd   Squigz      Suction to table and removal to improve strength   Hand gripper      to improve strength    digiflex      and pinches to improve strength   [place]     Isometric thumb ext to improve strength    Bubble wrap          TherActs x  x  In hand manipulation with placing small beads on pegboard to improve fine motor skills L hand

## 2024-02-13 ENCOUNTER — TELEPHONE (OUTPATIENT)
Dept: PULMONOLOGY | Age: 34
End: 2024-02-13

## 2024-02-13 NOTE — TELEPHONE ENCOUNTER
Patient called and left VMBM indicating Dulera is not helping .  Continues to c/o lump in back of his throat, chest tightness and difficulty breathing.  Called patient to get an update and left VMBM for him to call back and if any further symptoms.

## 2024-02-14 ENCOUNTER — HOSPITAL ENCOUNTER (OUTPATIENT)
Dept: OCCUPATIONAL THERAPY | Age: 34
Discharge: HOME | End: 2024-02-14
Payer: COMMERCIAL

## 2024-02-14 PROCEDURE — 97018 PARAFFIN BATH THERAPY: CPT

## 2024-02-14 PROCEDURE — 97110 THERAPEUTIC EXERCISES: CPT

## 2024-02-14 PROCEDURE — 97140 MANUAL THERAPY 1/> REGIONS: CPT

## 2024-02-14 NOTE — PROGRESS NOTES
Phone: [phone]                 [facility]    Fax: [phone]                       Outpatient Occupational Therapy                 DAILY TREATMENT NOTE    Date: 2/14/2024  Patient’s Name:  Kofi Roland  YOB: 1990 (34 y.o.)  Gender:  male  MRN:  [id number]  [facility] #: [id number]  Medical Diagnosis: S63.642 -  Sprain of metacarpophalangeal joint of left thumb    Referring Physician: No ref. provider found     INSURANCE  OT Insurance Information: [facility]   Total # of Visits Approved: 36   Total # of Visits to Date: 34       PAIN  [x]No     []Yes      Location:   Pain Rating (0-10 pain scale):  Pain Description:     SUBJECTIVE   Requested date extension to schedule last approved appt.             Flow Sheet   Exercise /   Manual treatment Weight/  Level Reps/Time Comments    Joint mobilizations and distractions x x         L wrist and thumb to improve ROM    PROM c progressive stretch x x L wrist and thumb to improve functional use and ROM      IASTM     L thumb MP area both dorsal and volar for STM    Cupping     L thumb MP area both dorsal and volar for STM     Thumb ROM     Pegboard - small and x-small pegs to improve thumb ROM   Power web  Green x20 MP flexion and extension to improve strength *   Egg  Green x20 Duckbill,  and pinches to improve stretch     HandMaster Red ball, black band  x20 Digit flexion and extension toimprove strength    AAROM     Thumb MP and IP composite flexion     Putty Yellow X8 minutes L hand pinches x 3     Place and hold     Thumb composite flexion to improve ROM    Flexbar  Green X20 Twists and bends to improve strengthd   Squigz      Suction to table and removal to improve strength   Hand gripper      to improve strength    digiflex      and pinches to improve strength   [place]     Isometric thumb ext to improve strength    Bubble wrap          TherActs     In hand manipulation with placing small beads on pegboard to improve fine motor skills L

## 2024-02-16 ENCOUNTER — HOSPITAL ENCOUNTER (OUTPATIENT)
Dept: OCCUPATIONAL THERAPY | Age: 34
Discharge: HOME | End: 2024-02-16
Payer: COMMERCIAL

## 2024-02-16 NOTE — PROGRESS NOTES
[facility]  Inpatient/Observation/Outpatient Rehabilitation    Date: 2024  Patient Name: Kofi Roland       [] Inpatient Acute/Observation       [x]  Outpatient  : 1990         [] Pt no showed for scheduled appointment    [] Pt refused/declined therapy at this time due to:           [x] Pt cancelled due to:  [] No Reason Given   [] Sick/ill   [x] Other: work emergency        Lydia Zheng OTR/CAMMY Date: 2024

## 2024-02-20 ENCOUNTER — TELEPHONE (OUTPATIENT)
Dept: PRIMARY CARE CLINIC | Age: 34
End: 2024-02-20

## 2024-02-20 NOTE — TELEPHONE ENCOUNTER
Called and left BM regarding the patients request and f/u reminder given .  Also to call back if any further questions.   She has a runny nose 3 days ago, she has a fever and cough  She has a itchy throat  She is coughing a lot  She is taking allergy med and Nyquil and MOTRIN/TYLENOL  [facility] Covid negative  Father will bring  Gave [address] apt  With sibs

## 2024-02-20 NOTE — TELEPHONE ENCOUNTER
Pulmonology is trying to address the issue with him.  It is not appropriate for me to prescribe medication at this time.  It appears that they tried to call him at 2:38 PM.  Please see if we can connect them.  Thank you.

## 2024-02-20 NOTE — TELEPHONE ENCOUNTER
Patient is wanting to have a steroid called in to help him until he sees pulmonology on Monday.  He complains of shortness of breath and chest tightness.  See telephone call from 2/13/2024.

## 2024-02-21 ENCOUNTER — HOSPITAL ENCOUNTER (OUTPATIENT)
Dept: OCCUPATIONAL THERAPY | Age: 34
Setting detail: THERAPIES SERIES
Discharge: HOME | End: 2024-02-21
Payer: COMMERCIAL

## 2024-02-21 PROCEDURE — 97110 THERAPEUTIC EXERCISES: CPT

## 2024-02-21 PROCEDURE — 97018 PARAFFIN BATH THERAPY: CPT

## 2024-02-21 PROCEDURE — 97140 MANUAL THERAPY 1/> REGIONS: CPT

## 2024-02-21 NOTE — PROGRESS NOTES
Phone: [phone]                 [facility]    Fax: [phone]                       Outpatient Occupational Therapy                 DAILY TREATMENT NOTE    Date: 2/21/2024  Patient’s Name:  Kofi Roland  YOB: 1990 (34 y.o.)  Gender:  male  MRN:  [id number]  [facility] #: [id number]  Medical Diagnosis: S63.642 -  Sprain of metacarpophalangeal joint of left thumb    Referring Physician: No ref. provider found     INSURANCE  OT Insurance Information: [facility]   Total # of Visits Approved: 36   Total # of Visits to Date: 35       PAIN  [x]No     []Yes      Location:   Pain Rating (0-10 pain scale):   Pain Description:     SUBJECTIVE   Patient states no pain, just soreness                 Flow Sheet   Exercise /   Manual treatment Weight/  Level Reps/Time Comments    Joint mobilizations and distractions x x         L wrist and thumb to improve ROM    PROM c progressive stretch x x L wrist and thumb to improve functional use and ROM      IASTM     L thumb MP area both dorsal and volar for STM    Cupping     L thumb MP area both dorsal and volar for STM     Thumb ROM     Pegboard - small and x-small pegs to improve thumb ROM   Power web  Green x20 MP flexion and extension to improve strength *   Egg  Green x20 Duckbill,  and pinches to improve stretch     HandMaster Red ball, black band  x20 Digit flexion and extension toimprove strength    AAROM     Thumb MP and IP composite flexion     Putty   L hand pinches x 3     Place and hold     Thumb composite flexion to improve ROM    Flexbar  Green X20 Twists and bends to improve strengthd   Squigz      Suction to table and removal to improve strength   Hand gripper      to improve strength    digiflex      and pinches to improve strength   [place] blue  x20 Isometric thumb ext to improve strength    Bubble wrap          TherActs     In hand manipulation with placing small beads on pegboard to improve fine motor skills L hand     clothes pins

## 2024-02-22 ENCOUNTER — HOSPITAL ENCOUNTER (OUTPATIENT)
Dept: OCCUPATIONAL THERAPY | Age: 34
Setting detail: THERAPIES SERIES
Discharge: HOME | End: 2024-02-22
Payer: COMMERCIAL

## 2024-02-22 PROCEDURE — 97018 PARAFFIN BATH THERAPY: CPT

## 2024-02-22 PROCEDURE — 97110 THERAPEUTIC EXERCISES: CPT

## 2024-02-22 PROCEDURE — 97140 MANUAL THERAPY 1/> REGIONS: CPT

## 2024-02-22 NOTE — PROGRESS NOTES
Phone: [phone]                 [facility]    Fax: [phone]                       Outpatient Occupational Therapy                 DAILY TREATMENT NOTE    Date: 2/22/2024  Patient’s Name:  Kofi Roland  YOB: 1990 (34 y.o.)  Gender:  male  MRN:  [id number]  [facility] #: [id number]  Medical Diagnosis: S63.642 -  Sprain of metacarpophalangeal joint of left thumb    Referring Physician: No ref. provider found     INSURANCE  OT Insurance Information: [facility]   Total # of Visits Approved: 36   Total # of Visits to Date: 36       PAIN  [x]No     []Yes      Location:   Pain Rating (0-10 pain scale):   Pain Description:     SUBJECTIVE   Patient's last [facility] approved visit. \            Flow Sheet   Exercise /   Manual treatment Weight/  Level Reps/Time Comments    Joint mobilizations and distractions x x         L wrist and thumb to improve ROM    PROM c progressive stretch x x L wrist and thumb to improve functional use and ROM      IASTM     L thumb MP area both dorsal and volar for STM    Cupping     L thumb MP area both dorsal and volar for STM     Thumb ROM     Pegboard - small and x-small pegs to improve thumb ROM   Power web  Green x20 MP flexion and extension to improve strength *   Egg  Green x20 Duckbill,  and pinches to improve stretch     HandMaster Red ball, black band  x20 Digit flexion and extension toimprove strength    AAROM     Thumb MP and IP composite flexion     Putty     L hand pinches x 3     Place and hold     Thumb composite flexion to improve ROM    Flexbar  Green X20 Twists and bends to improve strengthd   Squigz      Suction to table and removal to improve strength   Hand gripper      to improve strength    digiflex      and pinches to improve strength   [place] blue  x20 Isometric thumb ext to improve strength    Bubble wrap          TherActs     In hand manipulation with placing small beads on pegboard to improve fine motor skills L hand     clothes pins     Place

## 2024-02-25 DIAGNOSIS — J30.89 CHRONIC NON-SEASONAL ALLERGIC RHINITIS: ICD-10-CM

## 2024-02-25 RX ORDER — MONTELUKAST SODIUM 10 MG/1
10 TABLET ORAL NIGHTLY
Qty: 90 TABLET | Refills: 3 | Status: SHIPPED | OUTPATIENT
Start: 2024-02-25 | End: 2024-02-26 | Stop reason: ALTCHOICE

## 2024-02-26 ENCOUNTER — OFFICE VISIT (OUTPATIENT)
Dept: PULMONOLOGY | Age: 34
End: 2024-02-26
Payer: COMMERCIAL

## 2024-02-26 VITALS
RESPIRATION RATE: 16 BRPM | BODY MASS INDEX: 23.51 KG/M2 | HEART RATE: 85 BPM | SYSTOLIC BLOOD PRESSURE: 126 MMHG | TEMPERATURE: 97.9 F | DIASTOLIC BLOOD PRESSURE: 76 MMHG | WEIGHT: 167.9 LBS | HEIGHT: 71 IN | OXYGEN SATURATION: 99 %

## 2024-02-26 DIAGNOSIS — R07.89 SENSATION OF CHEST TIGHTNESS: Primary | ICD-10-CM

## 2024-02-26 PROCEDURE — 99214 OFFICE O/P EST MOD 30 MIN: CPT | Performed by: INTERNAL MEDICINE

## 2024-02-26 NOTE — PROGRESS NOTES
PULMONARY MEDICINE OUTPATIENT NOTE                                                                       PATIENT:  Kofi Roland  YOB: 1990  MRN: [id number]     REFERRED BY: Israel Hassan, SYDNEE - MYRANDA   CHIEF COMPLIANT: Asthma (Follow up Asthma-complains of tightness in th chest and shortness of breath, also complains of hoarseness.  Complains of nasal passages being blocked, and lump in throat. Also complains of feeling faint and dizzy, having difficulty focusing. Feels like he is going to pass out, but has not passed out but has to stop what he is doing and sit for an hour or so and then feels better but not normal.)       HISTORY     Kofi Roland is a 34 y.o. years old male is here for follow-up evaluation in the pulmonary clinic    INITIAL VISIT: 1/11/2024  DATE OF VISIT:2/26/2024    PULMONARY PROBLEM LIST:  1. Sensation of chest tightness         HISTORY OF PRESENT ILLNESS/CURRENT SYMPTOMS:   Patient is a former smoker, he quit smoking about 2 years back.  He smoked for about 18 years.  He presented with complaints of \"lump in the throat\", chest tightness and sensation of \"breathing through a straw\".  He denied any previous diagnosis of COPD or asthma.  Symptoms are more at night and says that he is to sit up to breathe better.  He was recently prescribed 40 mg prednisone, which made him jittery.  He has previously seen ENT and was told that he has TMJ.  Patient was started on a trial of Advair, however patient reports increased hoarseness of voice and no change in his symptoms.    PFT (12/27/2023) shows normal spirometry lung volumes and diffusion capacity  CT chest (9/14/2023) no acute or chronic pulmonary disease    CURRENT BRONCHODILATORS/OTHER PULMONARY MEDS:  Advair 250/50  Singulair  Omeprazole    TOBACCO HISTORY:  He  reports that he quit smoking about 2 years ago. His smoking use included cigarettes. He started smoking about 20 years ago. He has a 17.7 pack-year smoking history. He has

## 2024-02-26 NOTE — PATIENT INSTRUCTIONS
SURVEY:    Thank you for allowing us to care for you today.    You may be receiving a survey from [facility] regarding your visit today- electronically or via mail.      Please help us by completing the survey as this will provide the needed feedback to ensure we are providing the very best care for you and your family.    If you cannot score us a very good on any question, please call the office to discuss how we could have made your experience a very good one.    Thank you.       STAFF:    Jahaira Joya, Yolanda Webber, Kassi Finley      CLINICAL STAFF:    Lisa Will LPN, Kathe Lin LPN, Nga Irizarry LPN, Alcira Diallo CMA

## 2024-02-28 ENCOUNTER — OFFICE VISIT (OUTPATIENT)
Dept: PRIMARY CARE CLINIC | Age: 34
End: 2024-02-28
Payer: COMMERCIAL

## 2024-02-28 VITALS
RESPIRATION RATE: 18 BRPM | BODY MASS INDEX: 23.45 KG/M2 | DIASTOLIC BLOOD PRESSURE: 74 MMHG | TEMPERATURE: 98.5 F | SYSTOLIC BLOOD PRESSURE: 118 MMHG | OXYGEN SATURATION: 99 % | WEIGHT: 168.1 LBS | HEART RATE: 78 BPM

## 2024-02-28 DIAGNOSIS — J98.8: ICD-10-CM

## 2024-02-28 DIAGNOSIS — F43.9 STRESS: Primary | ICD-10-CM

## 2024-02-28 PROCEDURE — 99214 OFFICE O/P EST MOD 30 MIN: CPT | Performed by: NURSE PRACTITIONER

## 2024-02-28 RX ORDER — MIRTAZAPINE 7.5 MG/1
7.5 TABLET, FILM COATED ORAL NIGHTLY
Qty: 30 TABLET | Refills: 2 | Status: SHIPPED | OUTPATIENT
Start: 2024-02-28

## 2024-02-28 SDOH — ECONOMIC STABILITY: FOOD INSECURITY: WITHIN THE PAST 12 MONTHS, YOU WORRIED THAT YOUR FOOD WOULD RUN OUT BEFORE YOU GOT MONEY TO BUY MORE.: PATIENT DECLINED

## 2024-02-28 SDOH — ECONOMIC STABILITY: HOUSING INSECURITY
IN THE LAST 12 MONTHS, WAS THERE A TIME WHEN YOU DID NOT HAVE A STEADY PLACE TO SLEEP OR SLEPT IN A SHELTER (INCLUDING NOW)?: PATIENT DECLINED

## 2024-02-28 SDOH — ECONOMIC STABILITY: INCOME INSECURITY: HOW HARD IS IT FOR YOU TO PAY FOR THE VERY BASICS LIKE FOOD, HOUSING, MEDICAL CARE, AND HEATING?: PATIENT DECLINED

## 2024-02-28 SDOH — ECONOMIC STABILITY: FOOD INSECURITY: WITHIN THE PAST 12 MONTHS, THE FOOD YOU BOUGHT JUST DIDN'T LAST AND YOU DIDN'T HAVE MONEY TO GET MORE.: PATIENT DECLINED

## 2024-02-28 ASSESSMENT — PATIENT HEALTH QUESTIONNAIRE - PHQ9
SUM OF ALL RESPONSES TO PHQ QUESTIONS 1-9: 0
2. FEELING DOWN, DEPRESSED OR HOPELESS: 0
SUM OF ALL RESPONSES TO PHQ9 QUESTIONS 1 & 2: 0
SUM OF ALL RESPONSES TO PHQ QUESTIONS 1-9: 0
1. LITTLE INTEREST OR PLEASURE IN DOING THINGS: 0

## 2024-02-28 NOTE — PATIENT INSTRUCTIONS
SURVEY:     You may be receiving a survey from [facility] regarding your visit today.     Please complete the survey to enable us to provide the highest quality of care to you and your family.     If you cannot score us a very good on any question, please call the office to discuss how we could have made your experience a very good one.     Thank you,    Israel Hassan, APRN-CNP  Juany Otto, APRN-CNP  Kelley, LPN  Lupe, CMA  Kartik, CMA  Verónica, CMA  Gloria, PCA  Chanell, CMA  Laila, PM

## 2024-02-28 NOTE — PROGRESS NOTES
normal. Behavior is cooperative.         Cognition and Memory: Cognition normal.         Data:     Lab Results   Component Value Date/Time     09/12/2023 07:42 PM    K 3.4 09/12/2023 07:42 PM     09/12/2023 07:42 PM    CO2 24 09/12/2023 07:42 PM    BUN 7 09/12/2023 07:42 PM    CREATININE 0.9 09/12/2023 07:42 PM    GLUCOSE 123 09/12/2023 07:42 PM     Lab Results   Component Value Date/Time    WBC 6.3 09/12/2023 07:42 PM    RBC 4.80 09/12/2023 07:42 PM    HGB 14.6 09/12/2023 07:42 PM    HCT 41.8 09/12/2023 07:42 PM    MCV 87.1 09/12/2023 07:42 PM    MCH 30.4 09/12/2023 07:42 PM    MCHC 34.9 09/12/2023 07:42 PM    RDW 11.7 09/12/2023 07:42 PM     09/12/2023 07:42 PM    MPV 11.9 09/12/2023 07:42 PM     No results found for: \"TSH\"  No results found for: \"CHOL\", \"LDL\", \"HDL\", \"PSA\", \"LABA1C\"    Assessment/Plan:      Diagnosis Orders   1. Stress  mirtazapine (REMERON) 7.5 MG tablet      2. Inflammatory disorder of respiratory system  Food Comprehensive Panel    Celiac Reflex Panel        We will trial mirtazapine nightly.  We will follow with progress.  He is to call the office with any difficulties at all.  He is to stop the medication immediately if exhibiting thoughts of harming himself or others.  He is to call 911 or report to ER for evaluation.  He is agreeable and verbalizes understanding.    We will continue to workup a possible allergic condition.  Labs ordered.  We will follow with results.      1.  Kofi received counseling on the following healthy behaviors: nutrition, exercise, and medication adherence  2.  Patient given educational materials - see patient instructions  3.  Was a self-tracking handout given in paper form or via [employer]? No  If yes, see orders or list here.  4.  Discussed use, benefit, and side effects of prescribed medications.  Barriers to medication compliance addressed.  All patient questions answered.Pt voiced understanding.   5.  Reviewed prior labs and health

## 2024-02-29 ASSESSMENT — ENCOUNTER SYMPTOMS
SINUS PAIN: 0
HYPERVENTILATION: 0
CHOKING: 0
ABDOMINAL PAIN: 0
RHINORRHEA: 0
COUGH: 0
SORE THROAT: 0
TROUBLE SWALLOWING: 0
DIARRHEA: 1
NAUSEA: 0
SHORTNESS OF BREATH: 1
FEELING OF CHOKING: 0
WHEEZING: 0

## 2024-03-28 ENCOUNTER — OFFICE VISIT (OUTPATIENT)
Dept: PRIMARY CARE CLINIC | Age: 34
End: 2024-03-28
Payer: COMMERCIAL

## 2024-03-28 VITALS
WEIGHT: 172.4 LBS | OXYGEN SATURATION: 99 % | SYSTOLIC BLOOD PRESSURE: 118 MMHG | DIASTOLIC BLOOD PRESSURE: 78 MMHG | HEIGHT: 71 IN | TEMPERATURE: 98.7 F | BODY MASS INDEX: 24.14 KG/M2 | HEART RATE: 68 BPM | RESPIRATION RATE: 18 BRPM

## 2024-03-28 DIAGNOSIS — J06.9 VIRAL URI: ICD-10-CM

## 2024-03-28 DIAGNOSIS — F43.9 STRESS: Primary | ICD-10-CM

## 2024-03-28 PROCEDURE — 99214 OFFICE O/P EST MOD 30 MIN: CPT | Performed by: NURSE PRACTITIONER

## 2024-03-28 ASSESSMENT — ENCOUNTER SYMPTOMS
CONSTIPATION: 0
RHINORRHEA: 0
SHORTNESS OF BREATH: 0
NAUSEA: 0
FEELING OF CHOKING: 0
HYPERVENTILATION: 0
DIARRHEA: 0
WHEEZING: 0
VOMITING: 0
SORE THROAT: 0
COUGH: 0
ABDOMINAL PAIN: 0

## 2024-03-28 ASSESSMENT — PATIENT HEALTH QUESTIONNAIRE - PHQ9
SUM OF ALL RESPONSES TO PHQ QUESTIONS 1-9: 0
1. LITTLE INTEREST OR PLEASURE IN DOING THINGS: NOT AT ALL
SUM OF ALL RESPONSES TO PHQ QUESTIONS 1-9: 0
SUM OF ALL RESPONSES TO PHQ QUESTIONS 1-9: 0
SUM OF ALL RESPONSES TO PHQ9 QUESTIONS 1 & 2: 0
SUM OF ALL RESPONSES TO PHQ QUESTIONS 1-9: 0
2. FEELING DOWN, DEPRESSED OR HOPELESS: NOT AT ALL

## 2024-03-28 NOTE — PATIENT INSTRUCTIONS
SURVEY:     You may be receiving a survey from [facility] [employer] regarding your visit today.     Please complete the survey to enable us to provide the highest quality of care to you and your family.     If you cannot score us a very good on any question, please call the office to discuss how we could have made your experience a very good one.     Thank you,    Israel Hassan, APRN-CNP  Juany Otto, APRN-CNP  Kelley, LPN  Lupe, CMA  Kartik, CMA  Verónica, CMA  Gloria, PCA  Chanell, CMA  Laila, PM

## 2024-03-28 NOTE — PROGRESS NOTES
Name: Kofi Roland  : 1990         Chief Complaint:     Chief Complaint   Patient presents with    Stress     Patient states all is well.       URI       History of Present Illness:      Kofi Roland is a 34 y.o.  male who presents with Stress (Patient states all is well. /) and ESTIVEN Kirby is here today for a routine follow up office visit.    2023-No real improvement since last visit. States he has been compliant with meds. Still having SOB intermittently daily. Albuterol has not helped. He has stopped vaping. PFT next week.      UPDATE 2023-he states he did have improvement after starting the oral prednisone.  He states on the second day of 40 mg he became jittery and anxious so he dropped back down to 20.  He states he still felt improvement but then when he stopped taking medication all symptoms returned.  He also did increase WelChol but felt that he got a little \"bound up\".  He prefers Viberzi.  After his visit I did receive the PFT report which shows mild asthma potentially responsive to albuterol.    UPDATE 2024-Kofi states today that he has had no success with any respiratory medications therapy and tried.  He did have a follow-up visit with the pulmonologist who told him that he believes his issues are due to anxiety.  He was told there is no respiratory diagnosis that is causing his issues and to follow-up with this office for anxiety.  He does admit that maybe he has some anxiety and is willing to trial a medication to see if that helps with his symptoms.      UPDATE 2024-he states he is feeling very well.  He started mirtazapine and has not had any further issues with shortness of breath or respiratory issues.  He also does not have any problems with his throat or swallowing.  He has been sick recently with upper respiratory infection.  He states he is feeling better but does have fullness in his ears.  See below for further comments.    Anxiety  Presents for

## 2024-04-11 DIAGNOSIS — K58.0 IRRITABLE BOWEL SYNDROME WITH DIARRHEA: ICD-10-CM

## 2024-04-11 RX ORDER — ELUXADOLINE 100 MG/1
1 TABLET, FILM COATED ORAL 2 TIMES DAILY
Qty: 180 TABLET | Refills: 0 | Status: SHIPPED | OUTPATIENT
Start: 2024-04-11 | End: 2024-07-10

## 2024-05-24 DIAGNOSIS — F43.9 STRESS: ICD-10-CM

## 2024-05-24 RX ORDER — OMEPRAZOLE 40 MG/1
40 CAPSULE, DELAYED RELEASE ORAL 2 TIMES DAILY
COMMUNITY
Start: 2024-05-03

## 2024-05-24 RX ORDER — MIRTAZAPINE 7.5 MG/1
7.5 TABLET, FILM COATED ORAL NIGHTLY
Qty: 90 TABLET | Refills: 1 | Status: SHIPPED | OUTPATIENT
Start: 2024-05-24

## 2024-05-24 NOTE — TELEPHONE ENCOUNTER
Health Maintenance   Topic Date Due    Hepatitis B vaccine (1 of 3 - 3-dose series) 11/29/2024 (Originally 1990)    Flu vaccine (Season Ended) 11/29/2024 (Originally 8/1/2024)    COVID-19 Vaccine (1) 11/29/2024 (Originally 1990)    Hepatitis C screen  11/29/2024 (Originally 2/11/2008)    HIV screen  11/29/2024 (Originally 2/11/2005)    DTaP/Tdap/Td vaccine (1 - Tdap) 02/28/2025 (Originally 2/11/2009)    Pneumococcal 0-64 years Vaccine (1 of 2 - PCV) 02/28/2025 (Originally 2/11/1996)    Depression Screen  03/28/2025    Hepatitis A vaccine  Aged Out    Hib vaccine  Aged Out    HPV vaccine  Aged Out    Polio vaccine  Aged Out    Meningococcal (ACWY) vaccine  Aged Out    Varicella vaccine  Discontinued             (applicable per patient's age: Cancer Screenings, Depression Screening, Fall Risk Screening, Immunizations)    BUN (mg/dL)   Date Value   09/12/2023 7      (goal A1C is < 7)   (goal LDL is <100) need 30-50% reduction from baseline     BP Readings from Last 3 Encounters:   03/28/24 118/78   02/28/24 118/74   02/26/24 126/76    (goal /80)      All Future Testing planned in [facility]:  Lab Frequency Next Occurrence   Tilt table Once 10/17/2023   Basic Metabolic Panel Once 10/24/2023   Longterm Continuous Cardiac Event Monitor Once 10/17/2023   TSH Once 10/24/2023   T4, Free Once 10/24/2023   Cortisol Total Once 10/24/2023   Iron and TIBC Once 10/24/2023   Vitamin B12 & Folate Once 10/24/2023   Vitamin D 25 Hydroxy Once 10/24/2023   Allergen, Region 5 Respiratory Panel Once 01/11/2024   Eosinophil Count Once 01/11/2024   Food Comprehensive Panel Once 02/28/2024   Celiac Reflex Panel Once 02/28/2024       Next Visit Date:  Future Appointments   Date Time Provider Department Center   6/28/2024 10:40 AM Israel Hassan, APRN - CNP [facility]            Patient Active Problem List:     Abnormal small bowel biopsy     Irritable bowel syndrome with diarrhea     Chronic diarrhea     Esophagitis

## 2024-06-26 ENCOUNTER — TELEPHONE (OUTPATIENT)
Dept: PRIMARY CARE CLINIC | Age: 34
End: 2024-06-26

## 2024-07-03 ENCOUNTER — OFFICE VISIT (OUTPATIENT)
Dept: PRIMARY CARE CLINIC | Age: 34
End: 2024-07-03

## 2024-07-03 VITALS
SYSTOLIC BLOOD PRESSURE: 118 MMHG | BODY MASS INDEX: 26.65 KG/M2 | OXYGEN SATURATION: 98 % | WEIGHT: 191.1 LBS | TEMPERATURE: 98.5 F | DIASTOLIC BLOOD PRESSURE: 74 MMHG | RESPIRATION RATE: 18 BRPM | HEART RATE: 72 BPM

## 2024-07-03 DIAGNOSIS — F43.9 STRESS: Primary | ICD-10-CM

## 2024-07-03 DIAGNOSIS — K58.0 IRRITABLE BOWEL SYNDROME WITH DIARRHEA: ICD-10-CM

## 2024-07-03 RX ORDER — ELUXADOLINE 100 MG/1
1 TABLET, FILM COATED ORAL 2 TIMES DAILY
Qty: 180 TABLET | Refills: 0 | Status: SHIPPED | OUTPATIENT
Start: 2024-07-03 | End: 2024-10-01

## 2024-07-03 RX ORDER — MIRTAZAPINE 7.5 MG/1
7.5 TABLET, FILM COATED ORAL NIGHTLY
Qty: 90 TABLET | Refills: 3 | Status: SHIPPED | OUTPATIENT
Start: 2024-07-03

## 2024-07-03 ASSESSMENT — ENCOUNTER SYMPTOMS
WHEEZING: 0
NAUSEA: 0
CONSTIPATION: 0
RHINORRHEA: 0
HYPERVENTILATION: 0
SORE THROAT: 0
FEELING OF CHOKING: 0
BLOATING: 0
COUGH: 0
FLATUS: 0
VOMITING: 0
SHORTNESS OF BREATH: 0
ABDOMINAL PAIN: 0
DIARRHEA: 1

## 2024-07-03 ASSESSMENT — PATIENT HEALTH QUESTIONNAIRE - PHQ9
SUM OF ALL RESPONSES TO PHQ9 QUESTIONS 1 & 2: 0
SUM OF ALL RESPONSES TO PHQ QUESTIONS 1-9: 0
SUM OF ALL RESPONSES TO PHQ QUESTIONS 1-9: 0
2. FEELING DOWN, DEPRESSED OR HOPELESS: NOT AT ALL
SUM OF ALL RESPONSES TO PHQ QUESTIONS 1-9: 0
SUM OF ALL RESPONSES TO PHQ QUESTIONS 1-9: 0
1. LITTLE INTEREST OR PLEASURE IN DOING THINGS: NOT AT ALL

## 2024-07-03 NOTE — PROGRESS NOTES
Name: Kofi Roland  : 1990         Chief Complaint:     Chief Complaint   Patient presents with    Mental Health Problem     3 month check stress, feeling better.    Diarrhea       History of Present Illness:      Kofi Roland is a 34 y.o.  male who presents with Mental Health Problem (3 month check stress, feeling better.) and Diarrhea      Kofi is here today for a routine office visit.    Overall he states he is still feeling really good.  He states he has not had any further issues with swallowing or tightness in his throat.  He has been compliant with his medications.  He feels as though they are appropriate working well.  See below for further comments.    Anxiety  Presents for follow-up visit. Onset was at an unknown time. The problem has been unchanged. Patient reports no chest pain, compulsions, confusion, decreased concentration, depressed mood, dizziness, dry mouth, excessive worry, feeling of choking, hyperventilation, impotence, insomnia, irritability, malaise, muscle tension, nausea, nervous/anxious behavior, obsessions, palpitations, panic, restlessness, shortness of breath or suicidal ideas. Symptoms occur rarely. The severity of symptoms is mild. The quality of sleep is good. Nighttime awakenings: occasional.     Risk factors include recent illness. There is no history of anemia, anxiety/panic attacks, arrhythmia, asthma, bipolar disorder, CAD, CHF, chronic lung disease, depression, fibromyalgia, hyperthyroidism or suicide attempts. Past treatments include nothing. The treatment provided no relief. Compliance with medications is %.   Diarrhea   This is a chronic problem. The current episode started more than 1 year ago. The problem occurs 2 to 4 times per day. The problem has been gradually improving. The stool consistency is described as Watery. The patient states that diarrhea does not awaken him from sleep. Pertinent negatives include no abdominal pain, arthralgias, bloating,

## 2024-07-03 NOTE — PATIENT INSTRUCTIONS
SURVEY:     You may be receiving a survey from [facility] [employer] regarding your visit today.     Please complete the survey to enable us to provide the highest quality of care to you and your family.     If you cannot score us a very good on any question, please call the office to discuss how we could have made your experience a very good one.     Thank you,    Israel Hassan, APRN-CNP  Juany Otto, APRN-CNP  Kelley, LPN  Lupe, CMA  Kartik, CMA  Verónica, CMA  Gloria, PCA  Chanell, CMA  Laila, PM

## 2024-10-07 DIAGNOSIS — K58.0 IRRITABLE BOWEL SYNDROME WITH DIARRHEA: ICD-10-CM

## 2024-10-07 RX ORDER — ELUXADOLINE 100 MG/1
1 TABLET, FILM COATED ORAL 2 TIMES DAILY
Qty: 180 TABLET | Refills: 0 | Status: SHIPPED | OUTPATIENT
Start: 2024-10-07 | End: 2025-01-05

## 2025-01-03 ENCOUNTER — OFFICE VISIT (OUTPATIENT)
Dept: PRIMARY CARE CLINIC | Age: 35
End: 2025-01-03
Payer: COMMERCIAL

## 2025-01-03 VITALS
TEMPERATURE: 99.2 F | WEIGHT: 192.4 LBS | BODY MASS INDEX: 26.83 KG/M2 | HEART RATE: 90 BPM | DIASTOLIC BLOOD PRESSURE: 76 MMHG | OXYGEN SATURATION: 98 % | SYSTOLIC BLOOD PRESSURE: 128 MMHG

## 2025-01-03 DIAGNOSIS — K58.0 IRRITABLE BOWEL SYNDROME WITH DIARRHEA: ICD-10-CM

## 2025-01-03 DIAGNOSIS — K21.9 GASTROESOPHAGEAL REFLUX DISEASE WITHOUT ESOPHAGITIS: Primary | ICD-10-CM

## 2025-01-03 DIAGNOSIS — F43.9 STRESS: ICD-10-CM

## 2025-01-03 DIAGNOSIS — Z13.220 LIPID SCREENING: ICD-10-CM

## 2025-01-03 PROCEDURE — 99214 OFFICE O/P EST MOD 30 MIN: CPT | Performed by: NURSE PRACTITIONER

## 2025-01-03 RX ORDER — ELUXADOLINE 100 MG/1
1 TABLET, FILM COATED ORAL 2 TIMES DAILY
Qty: 180 TABLET | Refills: 0 | Status: SHIPPED | OUTPATIENT
Start: 2025-01-03 | End: 2025-04-03

## 2025-01-03 RX ORDER — OMEPRAZOLE 40 MG/1
40 CAPSULE, DELAYED RELEASE ORAL 2 TIMES DAILY
Qty: 180 CAPSULE | Refills: 3 | Status: SHIPPED | OUTPATIENT
Start: 2025-01-03

## 2025-01-03 SDOH — ECONOMIC STABILITY: INCOME INSECURITY: HOW HARD IS IT FOR YOU TO PAY FOR THE VERY BASICS LIKE FOOD, HOUSING, MEDICAL CARE, AND HEATING?: NOT HARD AT ALL

## 2025-01-03 SDOH — ECONOMIC STABILITY: FOOD INSECURITY: WITHIN THE PAST 12 MONTHS, THE FOOD YOU BOUGHT JUST DIDN'T LAST AND YOU DIDN'T HAVE MONEY TO GET MORE.: NEVER TRUE

## 2025-01-03 SDOH — ECONOMIC STABILITY: FOOD INSECURITY: WITHIN THE PAST 12 MONTHS, YOU WORRIED THAT YOUR FOOD WOULD RUN OUT BEFORE YOU GOT MONEY TO BUY MORE.: NEVER TRUE

## 2025-01-03 ASSESSMENT — PATIENT HEALTH QUESTIONNAIRE - PHQ9
SUM OF ALL RESPONSES TO PHQ QUESTIONS 1-9: 0
SUM OF ALL RESPONSES TO PHQ QUESTIONS 1-9: 0
1. LITTLE INTEREST OR PLEASURE IN DOING THINGS: NOT AT ALL
2. FEELING DOWN, DEPRESSED OR HOPELESS: NOT AT ALL
SUM OF ALL RESPONSES TO PHQ9 QUESTIONS 1 & 2: 0
SUM OF ALL RESPONSES TO PHQ QUESTIONS 1-9: 0
SUM OF ALL RESPONSES TO PHQ QUESTIONS 1-9: 0

## 2025-01-03 ASSESSMENT — ENCOUNTER SYMPTOMS
FLATUS: 0
VOMITING: 0
NAUSEA: 0
HYPERVENTILATION: 0
DIARRHEA: 1
WATER BRASH: 0
FEELING OF CHOKING: 0
SORE THROAT: 0
CHOKING: 0
HOARSE VOICE: 0
RHINORRHEA: 0
WHEEZING: 0
CONSTIPATION: 0
BELCHING: 0
HEARTBURN: 0
SHORTNESS OF BREATH: 0
COUGH: 0
ABDOMINAL PAIN: 0
GLOBUS SENSATION: 0
BLOATING: 0

## 2025-01-03 NOTE — PROGRESS NOTES
Name: Kofi Roland  : 1990         Chief Complaint:     Chief Complaint   Patient presents with    Mental Health Problem     6 month.     Gastroesophageal Reflux    Inflammatory Bowel Disease       History of Present Illness:      Kofi Roland is a 34 y.o.  male who presents with Mental Health Problem (6 month. ), Gastroesophageal Reflux, and Inflammatory Bowel Disease      Kofi is here today for a routine office visit.    Overall he states he is feeling very well.  Currently he is not taking mirtazapine and states he feels as though he is doing okay.  He states he forgot to take the medication for a while and really noticed that he probably did not need it.  He would like to have us fill his omeprazole prescription as it is hard for him to get to GI routinely.  I did explain he will need to continue with them for follow-up EGD/colonoscopy.  He is agreeable.  See below for further comments.    Gastroesophageal Reflux  He reports no abdominal pain, no belching, no chest pain, no choking, no coughing, no dysphagia, no early satiety, no globus sensation, no heartburn, no hoarse voice, no nausea, no sore throat, no water brash or no wheezing. This is a chronic problem. The current episode started more than 1 year ago. The problem occurs rarely. The problem has been unchanged. The symptoms are aggravated by certain foods and stress. Pertinent negatives include no anemia, fatigue, melena, muscle weakness, orthopnea or weight loss. There are no known risk factors. He has tried a PPI for the symptoms. The treatment provided significant relief. Past procedures include an EGD and a UGI. Past invasive treatments do not include gastroplasty, gastroplication or reflux surgery.   Anxiety  Presents for follow-up visit. Patient reports no chest pain, compulsions, confusion, decreased concentration, depressed mood, dizziness, dry mouth, excessive worry, feeling of choking, hyperventilation, impotence, insomnia,

## 2025-01-03 NOTE — PATIENT INSTRUCTIONS
SURVEY:     You may be receiving a survey from [facility] [employer] regarding your visit today.     Please complete the survey to enable us to provide the highest quality of care to you and your family.     If you cannot score us a very good on any question, please call the office to discuss how we could have made your experience a very good one.     Thank you,    Israel Hassan, APRN-CNP  Juany Otto, APRN-CNP  Kelley, LPN  Lupe, CMA  Kartik, CMA  Verónica, CMA  Gloria, PCA  Chanell, CMA  Laila, PM

## 2025-01-13 DIAGNOSIS — K58.0 IRRITABLE BOWEL SYNDROME WITH DIARRHEA: ICD-10-CM

## 2025-01-13 RX ORDER — ELUXADOLINE 100 MG/1
1 TABLET, FILM COATED ORAL 2 TIMES DAILY
Qty: 180 TABLET | Refills: 0 | Status: SHIPPED | OUTPATIENT
Start: 2025-01-13 | End: 2025-04-13

## 2025-01-13 NOTE — TELEPHONE ENCOUNTER
Insurance will not cover med at [facility].  Needs sent to [facility]    Health Maintenance   Topic Date Due    DTaP/Tdap/Td vaccine (1 - Tdap) 02/28/2025 (Originally 2/11/2009)    Pneumococcal 0-64 years Vaccine (1 of 2 - PCV) 02/28/2025 (Originally 2/11/1996)    Flu vaccine (1) 06/30/2025 (Originally 8/1/2024)    Hepatitis B vaccine (1 of 3 - 19+ 3-dose series) 01/03/2026 (Originally 2/11/2009)    COVID-19 Vaccine (1 - 2023-24 season) 01/03/2026 (Originally 9/1/2024)    Hepatitis C screen  01/03/2026 (Originally 2/11/2008)    HIV screen  01/03/2026 (Originally 2/11/2005)    Depression Screen  01/03/2026    Hepatitis A vaccine  Aged Out    Hib vaccine  Aged Out    HPV vaccine  Aged Out    Polio vaccine  Aged Out    Meningococcal (ACWY) vaccine  Aged Out    Varicella vaccine  Discontinued             (applicable per patient's age: Cancer Screenings, Depression Screening, Fall Risk Screening, Immunizations)    BUN (mg/dL)   Date Value   09/12/2023 7      (goal A1C is < 7)   (goal LDL is <100) need 30-50% reduction from baseline     BP Readings from Last 3 Encounters:   01/03/25 128/76   07/03/24 118/74   03/28/24 118/78    (goal /80)      All Future Testing planned in [facility]:  Lab Frequency Next Occurrence   Food Comprehensive Panel Once 02/28/2024   Celiac Reflex Panel Once 02/28/2024   CBC with Auto Differential Once 07/02/2025   ALT Once 07/03/2025   AST Once 07/03/2025   Lipid Panel Once 07/02/2025   Basic Metabolic Panel Once 07/03/2025       Next Visit Date:  Future Appointments   Date Time Provider Department Center   7/3/2025  8:00 AM Israel Hassan, APRN - CNP [facility]            Patient Active Problem List:     Abnormal small bowel biopsy     Irritable bowel syndrome with diarrhea     Chronic diarrhea     Esophagitis     GERD (gastroesophageal reflux disease)     Chronic non-seasonal allergic rhinitis     Stress

## 2025-06-24 ENCOUNTER — TELEPHONE (OUTPATIENT)
Dept: PRIMARY CARE CLINIC | Age: 35
End: 2025-06-24

## 2025-07-03 ENCOUNTER — OFFICE VISIT (OUTPATIENT)
Dept: PRIMARY CARE CLINIC | Age: 35
End: 2025-07-03

## 2025-07-03 VITALS
RESPIRATION RATE: 16 BRPM | HEART RATE: 85 BPM | BODY MASS INDEX: 26.12 KG/M2 | SYSTOLIC BLOOD PRESSURE: 130 MMHG | OXYGEN SATURATION: 98 % | TEMPERATURE: 98.1 F | WEIGHT: 186.6 LBS | HEIGHT: 71 IN | DIASTOLIC BLOOD PRESSURE: 82 MMHG

## 2025-07-03 DIAGNOSIS — K52.9 CHRONIC DIARRHEA: ICD-10-CM

## 2025-07-03 DIAGNOSIS — K21.9 GASTROESOPHAGEAL REFLUX DISEASE WITHOUT ESOPHAGITIS: Primary | ICD-10-CM

## 2025-07-03 DIAGNOSIS — F43.9 STRESS: ICD-10-CM

## 2025-07-03 RX ORDER — OMEPRAZOLE 40 MG/1
40 CAPSULE, DELAYED RELEASE ORAL 2 TIMES DAILY
Qty: 180 CAPSULE | Refills: 3 | Status: SHIPPED | OUTPATIENT
Start: 2025-07-03

## 2025-07-03 RX ORDER — ELUXADOLINE 100 MG/1
TABLET, FILM COATED ORAL
COMMUNITY
Start: 2025-04-11

## 2025-07-03 SDOH — ECONOMIC STABILITY: FOOD INSECURITY: WITHIN THE PAST 12 MONTHS, THE FOOD YOU BOUGHT JUST DIDN'T LAST AND YOU DIDN'T HAVE MONEY TO GET MORE.: NEVER TRUE

## 2025-07-03 SDOH — ECONOMIC STABILITY: FOOD INSECURITY: WITHIN THE PAST 12 MONTHS, YOU WORRIED THAT YOUR FOOD WOULD RUN OUT BEFORE YOU GOT MONEY TO BUY MORE.: NEVER TRUE

## 2025-07-03 ASSESSMENT — ENCOUNTER SYMPTOMS
DIARRHEA: 1
HYPERVENTILATION: 0
WATER BRASH: 0
SORE THROAT: 0
VOMITING: 0
BELCHING: 0
CONSTIPATION: 0
ABDOMINAL PAIN: 0
FLATUS: 0
CHOKING: 0
HOARSE VOICE: 0
FEELING OF CHOKING: 0
COUGH: 0
GLOBUS SENSATION: 0
RHINORRHEA: 0
SHORTNESS OF BREATH: 0
NAUSEA: 0
BLOATING: 0
HEARTBURN: 0
WHEEZING: 0

## 2025-07-03 ASSESSMENT — PATIENT HEALTH QUESTIONNAIRE - PHQ9
2. FEELING DOWN, DEPRESSED OR HOPELESS: NOT AT ALL
SUM OF ALL RESPONSES TO PHQ QUESTIONS 1-9: 0
1. LITTLE INTEREST OR PLEASURE IN DOING THINGS: NOT AT ALL
SUM OF ALL RESPONSES TO PHQ QUESTIONS 1-9: 0

## 2025-07-03 NOTE — PROGRESS NOTES
Name: Kofi Roland  : 1990         Chief Complaint:     Chief Complaint   Patient presents with    Gastroesophageal Reflux     6 month check.     Mental Health Problem     6 month check.        History of Present Illness:      Kofi Roland is a 35 y.o.  male who presents with Gastroesophageal Reflux (6 month check. ) and Mental Health Problem (6 month check. )      Kofi is here today for a routine office visit.    He states he is feeling very well.  Current medications are working well for chronic diarrhea and GERD.  He is remained off of mirtazapine for over 6 months and feels as though he does not need the medication any longer.  Stress levels are low.  He has not felt the palpitations or trouble swallowing since stopping the mirtazapine.  Unfortunately he was unable to obtain lab work prior to today's visit.  He states he is very busy at work right now but will be able to get them completed in the near future.    Gastroesophageal Reflux  He reports no abdominal pain, no belching, no chest pain, no choking, no coughing, no dysphagia, no early satiety, no globus sensation, no heartburn, no hoarse voice, no nausea, no sore throat, no water brash or no wheezing. This is a chronic problem. The current episode started more than 1 year ago. The problem occurs rarely. The problem has been unchanged. The symptoms are aggravated by certain foods and stress. Pertinent negatives include no anemia, fatigue, melena, muscle weakness, orthopnea or weight loss. There are no known risk factors. He has tried a PPI for the symptoms. The treatment provided significant relief. Past procedures include an EGD and a UGI. Past invasive treatments do not include gastroplasty, gastroplication or reflux surgery.   Diarrhea   This is a chronic problem. The current episode started more than 1 year ago. The problem occurs 2 to 4 times per day. The problem has been gradually improving. The stool consistency is described as Watery.

## 2025-07-03 NOTE — PATIENT INSTRUCTIONS
SURVEY:     You may be receiving a survey from [facility] [employer] regarding your visit today.     Please complete the survey to enable us to provide the highest quality of care to you and your family.     If you cannot score us a very good on any question, please call the office to discuss how we could have made your experience a very good one.     Thank you,    Israel Hassan, APRN-CNP  Juany Otto, APRN-CNP  Kelley, LPN  Lupe, CMA  Kartik, CMA  Verónica, CMA  Gloria, PCA  Chanell, CMA  Laila, PM